# Patient Record
Sex: FEMALE | Race: WHITE | Employment: FULL TIME | URBAN - METROPOLITAN AREA
[De-identification: names, ages, dates, MRNs, and addresses within clinical notes are randomized per-mention and may not be internally consistent; named-entity substitution may affect disease eponyms.]

---

## 2017-05-01 ENCOUNTER — OFFICE VISIT (OUTPATIENT)
Dept: URGENT CARE | Age: 22
End: 2017-05-01

## 2017-05-01 VITALS
TEMPERATURE: 98.9 F | HEIGHT: 63 IN | OXYGEN SATURATION: 95 % | RESPIRATION RATE: 12 BRPM | HEART RATE: 67 BPM | DIASTOLIC BLOOD PRESSURE: 73 MMHG | BODY MASS INDEX: 26.22 KG/M2 | WEIGHT: 148 LBS | SYSTOLIC BLOOD PRESSURE: 115 MMHG

## 2017-05-01 DIAGNOSIS — L30.9 ACUTE DERMATITIS: Primary | ICD-10-CM

## 2017-05-01 PROCEDURE — 99203 OFFICE O/P NEW LOW 30 MIN: CPT | Performed by: EMERGENCY MEDICINE

## 2017-05-01 RX ORDER — TRIAMCINOLONE ACETONIDE 1 MG/G
CREAM TOPICAL
Qty: 15 G | Refills: 0 | Status: SHIPPED | OUTPATIENT
Start: 2017-05-01 | End: 2017-10-10 | Stop reason: SDUPTHER

## 2017-05-01 RX ORDER — TRIAMCINOLONE ACETONIDE 1 MG/G
CREAM TOPICAL
Qty: 15 G | Refills: 0 | Status: SHIPPED | OUTPATIENT
Start: 2017-05-01 | End: 2017-05-01

## 2017-05-01 ASSESSMENT — ENCOUNTER SYMPTOMS
SHORTNESS OF BREATH: 0
EYES NEGATIVE: 1
COUGH: 0
GASTROINTESTINAL NEGATIVE: 1
SORE THROAT: 0

## 2017-05-05 RX ORDER — KETOCONAZOLE 20 MG/G
CREAM TOPICAL
Qty: 15 G | Refills: 1 | Status: SHIPPED | OUTPATIENT
Start: 2017-05-05 | End: 2017-10-10

## 2017-10-10 ENCOUNTER — OFFICE VISIT (OUTPATIENT)
Dept: PRIMARY CARE CLINIC | Age: 22
End: 2017-10-10

## 2017-10-10 VITALS
BODY MASS INDEX: 26.79 KG/M2 | HEIGHT: 63 IN | SYSTOLIC BLOOD PRESSURE: 128 MMHG | DIASTOLIC BLOOD PRESSURE: 82 MMHG | WEIGHT: 151.2 LBS

## 2017-10-10 DIAGNOSIS — S86.899A SHIN SPLINTS, UNSPECIFIED LATERALITY, INITIAL ENCOUNTER: ICD-10-CM

## 2017-10-10 DIAGNOSIS — Z00.00 ROUTINE PHYSICAL EXAMINATION: Primary | ICD-10-CM

## 2017-10-10 PROCEDURE — 90686 IIV4 VACC NO PRSV 0.5 ML IM: CPT | Performed by: INTERNAL MEDICINE

## 2017-10-10 PROCEDURE — 90472 IMMUNIZATION ADMIN EACH ADD: CPT | Performed by: INTERNAL MEDICINE

## 2017-10-10 PROCEDURE — 90715 TDAP VACCINE 7 YRS/> IM: CPT | Performed by: INTERNAL MEDICINE

## 2017-10-10 PROCEDURE — 99395 PREV VISIT EST AGE 18-39: CPT | Performed by: INTERNAL MEDICINE

## 2017-10-10 PROCEDURE — 90471 IMMUNIZATION ADMIN: CPT | Performed by: INTERNAL MEDICINE

## 2017-10-10 RX ORDER — TRIAMCINOLONE ACETONIDE 1 MG/G
CREAM TOPICAL
Qty: 15 G | Refills: 0 | Status: SHIPPED | OUTPATIENT
Start: 2017-10-10 | End: 2018-05-31 | Stop reason: ALTCHOICE

## 2017-10-10 RX ORDER — ALBUTEROL SULFATE 90 UG/1
2 AEROSOL, METERED RESPIRATORY (INHALATION) EVERY 6 HOURS PRN
Qty: 1 INHALER | Refills: 3 | Status: SHIPPED | OUTPATIENT
Start: 2017-10-10

## 2017-10-10 ASSESSMENT — PATIENT HEALTH QUESTIONNAIRE - PHQ9
2. FEELING DOWN, DEPRESSED OR HOPELESS: 0
SUM OF ALL RESPONSES TO PHQ QUESTIONS 1-9: 0
1. LITTLE INTEREST OR PLEASURE IN DOING THINGS: 0
SUM OF ALL RESPONSES TO PHQ9 QUESTIONS 1 & 2: 0

## 2017-10-10 NOTE — PROGRESS NOTES
History and Physical      Ita Sweet  YOB: 1995    Date of Service:  10/10/2017    Chief Complaint:   Ita Sweet is a 23 y.o. female who presents for complete physical examination. HPI: doing well. Wants to start running and training 10k. Bilateral anterior shin pain after running    Wt Readings from Last 3 Encounters:   10/10/17 151 lb 3.2 oz (68.6 kg)   05/01/17 148 lb (67.1 kg)   10/26/16 151 lb (68.5 kg)     BP Readings from Last 3 Encounters:   10/10/17 128/82   05/01/17 115/73   10/26/16 122/78       Patient Active Problem List   Diagnosis    Seasonal allergic rhinitis    Exercise-induced asthma    Eczema       Preventive Care:  Health Maintenance   Topic Date Due    HIV screen  05/21/2008    DTaP/Tdap/Td vaccine (1 - Tdap) 05/21/2012    Pneumococcal med risk (1 of 1 - PPSV23) 05/21/2012    Chlamydia screen  08/28/2016    Flu vaccine (1) 09/01/2017    Cervical cancer screen  08/28/2018      Hx abnormal PAP: no  Good basic nutrition: yes  Self-breast exams: no    Exercise: aerobics 3 time(s) per week  Seatbelt use: 100%  Lipid panel:   Lab Results   Component Value Date    CHOL 195 01/21/2016    TRIG 123 01/21/2016    HDL 67 (H) 01/21/2016    LDLCALC 103 (H) 01/21/2016        Living will:  no,   patient declined      There is no immunization history on file for this patient.     Allergies   Allergen Reactions    Sulfa Antibiotics Hives     Outpatient Prescriptions Marked as Taking for the 10/10/17 encounter (Office Visit) with Michael Ferreira MD   Medication Sig Dispense Refill    fluticasone (FLONASE) 50 MCG/ACT nasal spray 1 spray by Nasal route daily 1 Bottle 3    albuterol sulfate HFA (PROVENTIL HFA) 108 (90 BASE) MCG/ACT inhaler Inhale 2 puffs into the lungs every 6 hours as needed for Wheezing 1 Inhaler 3       Past Medical History:   Diagnosis Date    Exercise-induced asthma 1/21/2016    Seasonal allergic rhinitis 1/21/2016     Past Surgical History: distress. She has no wheezes, rhonchi or rales. Abdominal: Soft, non-tender. Bowel sounds and aorta are normal. She exhibits no organomegaly, mass or bruit. Genitourinary: performed by gynecologist.  Breast exam:  performed by specialist.  Musculoskeletal: Normal range of motion, no synovitis. She exhibits no edema. Neurological: She is alert and oriented to person, place, and time. She has normal reflexes. No cranial nerve deficit. Coordination normal.   Skin: Skin is warm and dry. There is no rash or erythema. No suspicious lesions noted. Psychiatric: She has a normal mood and affect. Her speech is normal and behavior is normal. Judgment, cognition and memory are normal.         Lab Review: not applicable     Assessment/Plan:    1. Routine physical examination  Commended adequate calcium, vitamin D intake, regular exercise and good basic nutrition  - [facility] - Gisselle Dick MD  - Lipid Panel; Future  - Hemoglobin A1C; Future        3.  Shin splints, unspecified laterality, initial encounter  You tube stretches, ice, nsaids, rest prn  F/u sports med if worsen

## 2017-10-12 LAB
CHOLESTEROL, TOTAL: 171 MG/DL (ref 0–199)
HDLC SERPL-MCNC: 61 MG/DL (ref 40–60)
LDL CHOLESTEROL CALCULATED: 97 MG/DL
TRIGL SERPL-MCNC: 64 MG/DL (ref 0–150)
VLDLC SERPL CALC-MCNC: 13 MG/DL

## 2017-10-13 LAB
ESTIMATED AVERAGE GLUCOSE: 108.3 MG/DL
HBA1C MFR BLD: 5.4 %

## 2017-10-19 ENCOUNTER — OFFICE VISIT (OUTPATIENT)
Dept: GYNECOLOGY | Age: 22
End: 2017-10-19

## 2017-10-19 VITALS
OXYGEN SATURATION: 97 % | DIASTOLIC BLOOD PRESSURE: 68 MMHG | SYSTOLIC BLOOD PRESSURE: 113 MMHG | HEART RATE: 71 BPM | BODY MASS INDEX: 27.21 KG/M2 | WEIGHT: 152.4 LBS

## 2017-10-19 DIAGNOSIS — Z12.4 CERVICAL CANCER SCREENING: ICD-10-CM

## 2017-10-19 DIAGNOSIS — Z76.89 ENCOUNTER TO ESTABLISH CARE: Primary | ICD-10-CM

## 2017-10-19 DIAGNOSIS — Z01.419 WOMEN'S ANNUAL ROUTINE GYNECOLOGICAL EXAMINATION: ICD-10-CM

## 2017-10-19 DIAGNOSIS — Z11.3 SCREEN FOR STD (SEXUALLY TRANSMITTED DISEASE): ICD-10-CM

## 2017-10-19 PROCEDURE — 99385 PREV VISIT NEW AGE 18-39: CPT | Performed by: NURSE PRACTITIONER

## 2017-10-19 NOTE — PATIENT INSTRUCTIONS
condoms. For men  · Tests for sexually transmitted infections (STIs). Ask whether you should have tests for STIs. You may be at risk if you have sex with more than one person, especially if you do not wear a condom. · Testicular cancer exam. Ask your doctor whether you should check your testicles regularly. · Prostate exam. Talk to your doctor about whether you should have a blood test (called a PSA test) for prostate cancer. Experts differ on whether and when men should have this test. Some experts suggest it if you are older than 39 and are -American or have a father or brother who got prostate cancer when he was younger than 72. When should you call for help? Watch closely for changes in your health, and be sure to contact your doctor if you have any problems or symptoms that concern you. Where can you learn more? Go to https://Zen Plannerpebensoneb.healthAdzCentral. org and sign in to your Delta Systems account. Enter P072 in the BioConsortia box to learn more about \"Well Visit, Ages 25 to 48: Care Instructions. \"     If you do not have an account, please click on the \"Sign Up Now\" link. Current as of: July 19, 2016  Content Version: 11.3  © 3082-2760 Tabtor. Care instructions adapted under license by [facility] ([facility]). If you have questions about a medical condition or this instruction, always ask your healthcare professional. [place] [address] any warranty or liability for your use of this information. Patient Education        Pap Test: Care Instructions  Your Care Instructions  The Pap test (also called a Pap smear) is a screening test for cancer of the cervix, which is the lower part of the uterus that opens into the vagina. The test can help your doctor find early changes in the cells that could lead to cancer. The sample of cells taken during your test has been sent to a lab so that an expert can look at the cells.  It usually takes a week or two to get the results back. Follow-up care is a key part of your treatment and safety. Be sure to make and go to all appointments, and call your doctor if you are having problems. It's also a good idea to know your test results and keep a list of the medicines you take. What do the results mean? · A normal result means that the test did not find any abnormal cells in the sample. · An abnormal result can mean many things. Most of these are not cancer. The results of your test may be abnormal because:  ¨ You have an infection of the vagina or cervix, such as a yeast infection. ¨ You have an IUD (intrauterine device for birth control). ¨ You have low estrogen levels after menopause that are causing the cells to change. ¨ You have cell changes that may be a sign of precancer or cancer. The results are ranked based on how serious the changes might be. There are many other reasons why you might not get a normal result. If the results were abnormal, you may need to get another test within a few weeks or months. If the results show changes that could be a sign of cancer, you may need a test called a colposcopy, which provides a more complete view of the cervix. Sometimes the lab cannot use the sample because it does not contain enough cells or was not preserved well. If so, you may need to have the test again. This is not common, but it does happen from time to time. When should you call for help? Watch closely for changes in your health, and be sure to contact your doctor if:  · You have vaginal bleeding or pain for more than 2 days after the test. It is normal to have a small amount of bleeding for a day or two after the test.  Where can you learn more? Go to https://Altruikpemokono.Caribbean Telecom Partners. org and sign in to your Sportsgrit account. Enter Q454 in the UVLrx Therapeutics box to learn more about \"Pap Test: Care Instructions. \"     If you do not have an account, please click on the \"Sign Up Now\" link.   Current as of: July 26, 2016  Content Version: 11.3  © 2666-0126 Celleration. Care instructions adapted under license by [facility] ([facility]). If you have questions about a medical condition or this instruction, always ask your healthcare professional. [address] any warranty or liability for your use of this information. Patient Education        Breast Self-Exam: Care Instructions  Your Care Instructions  A breast self-exam is when you check your breasts for lumps or changes. This regular exam helps you learn how your breasts normally look and feel. Most breast problems or changes are not because of cancer. Breast self-exam is not a substitute for a mammogram. Having regular breast exams by your doctor and regular mammograms improve your chances of finding any problems with your breasts. Some women set a time each month to do a step-by-step breast self-exam. Other women like a less formal system. They might look at their breasts as they brush their teeth, or feel their breasts once in a while in the shower. If you notice a change in your breast, tell your doctor. Follow-up care is a key part of your treatment and safety. Be sure to make and go to all appointments, and call your doctor if you are having problems. Its also a good idea to know your test results and keep a list of the medicines you take. How do you do a breast self-exam?  · The best time to examine your breasts is usually one week after your menstrual period begins. Your breasts should not be tender then. If you do not have periods, you might do your exam on a day of the month that is easy to remember. · To examine your breasts:  ¨ Remove all your clothes above the waist and lie down. When you are lying down, your breast tissue spreads evenly over your chest wall, which makes it easier to feel all your breast tissue.   ¨ Use the padsnot the fingertipsof the 3 middle fingers of your left hand to check your right breast. Move your fingers slowly in small coin-sized circles that overlap. ¨ Use three levels of pressure to feel of all your breast tissue. Use light pressure to feel the tissue close to the skin surface. Use medium pressure to feel a little deeper. Use firm pressure to feel your tissue close to your breastbone and ribs. Use each pressure level to feel your breast tissue before moving on to the next spot. ¨ Check your entire breast, moving up and down as if following a strip from the collarbone to the bra line, and from the armpit to the ribs. Repeat until you have covered the entire breast.  ¨ Repeat this procedure for your left breast, using the pads of the 3 middle fingers of your right hand. · To examine your breasts while in the shower:  ¨ Place one arm over your head and lightly soap your breast on that side. ¨ Using the pads of your fingers, gently move your hand over your breast (in the strip pattern described above), feeling carefully for any lumps or changes. ¨ Repeat for the other breast.  · Have your doctor inspect anything you notice to see if you need further testing. Where can you learn more? Go to https://The Key RevolutionpeMacrotherapyeb.HOTPOTATO MEDIA. org and sign in to your Dimdim account. Enter P148 in the [facility] box to learn more about \"Breast Self-Exam: Care Instructions. \"     If you do not have an account, please click on the \"Sign Up Now\" link. Current as of: July 26, 2016  Content Version: 11.3  © 9989-0784 Comat Technologies. Care instructions adapted under license by [facility] ([facility]). If you have questions about a medical condition or this instruction, always ask your healthcare professional. [place] [address] any warranty or liability for your use of this information. Patient Education        Eating Healthy Foods: Care Instructions  Your Care Instructions  Eating healthy foods can help lower your risk for disease.  Healthy food gives you energy and keeps your heart often contain few or no fruits or vegetables. Make sure you eat some fruits and vegetables to make the meal more nutritious. · Look at your food diary. For each food group, add up what you have eaten and then divide the total by the number of days. This will give you an idea of how much you are eating from each food group. See if you can find some ways to change your diet to make it more healthy. Start small  · Do not try to make dramatic changes to your diet all at once. You might feel that you are missing out on your favorite foods and then be more likely to fail. · Start slowly, and gradually change your habits. Try some of the following:  ¨ Use whole wheat bread instead of white bread. ¨ Use nonfat or low-fat milk instead of whole milk. ¨ Eat brown rice instead of white rice, and eat whole wheat pasta instead of white-flour pasta. ¨ Try low-fat cheeses and low-fat yogurt. ¨ Add more fruits and vegetables to meals and have them for snacks. ¨ Add lettuce, tomato, cucumber, and onion to sandwiches. ¨ Add fruit to yogurt and cereal.  Enjoy food  · You can still eat your favorite foods. You just may need to eat less of them. If your favorite foods are high in fat, salt, and sugar, limit how often you eat them, but do not cut them out entirely. · Eat a wide variety of foods. Make healthy choices when eating out  · The type of restaurant you choose can help you make healthy choices. Even fast-food chains are now offering more low-fat or healthier choices on the menu. · Choose smaller portions, or take half of your meal home. · When eating out, try:  ¨ A veggie pizza with a whole wheat crust or grilled chicken (instead of sausage or pepperoni). ¨ Pasta with roasted vegetables, grilled chicken, or marinara sauce instead of cream sauce. ¨ A vegetable wrap or grilled chicken wrap. ¨ Broiled or poached food instead of fried or breaded items.   Make healthy choices easy  · Buy packaged, prewashed, ready-to-eat fresh vegetables and fruits, such as baby carrots, salad mixes, and chopped or shredded broccoli and cauliflower. · Buy packaged, presliced fruits, such as melon or pineapple. · Choose 100% fruit or vegetable juice instead of soda. Limit juice intake to 4 to 6 oz (½ to ¾ cup) a day. · Blend low-fat yogurt, fruit juice, and canned or frozen fruit to make a smoothie for breakfast or a snack. Where can you learn more? Go to https://ChipSensorspeArray StormewXAircraft.FriendsEAT. org and sign in to your Sociable Labs account. Enter G521 in the Communication Science box to learn more about \"Eating Healthy Foods: Care Instructions. \"     If you do not have an account, please click on the \"Sign Up Now\" link. Current as of: April 3, 2017  Content Version: 11.3  © 4600-7500 Motilo. Care instructions adapted under license by [facility] ([facility]). If you have questions about a medical condition or this instruction, always ask your healthcare professional. [place] [address] any warranty or liability for your use of this information. Patient Education        Learning About Physical Activity  What is physical activity? Physical activity is any kind of activity that gets your body moving. The types of physical activity that can help you get fit and stay healthy include:  · Aerobic or \"cardio\" activities that make your heart beat faster and make you breathe harder, such as brisk walking, riding a bike, or running. Aerobic activities strengthen your heart and lungs and build up your endurance. · Strength training activities that make your muscles work against, or \"resist,\" something, such as lifting weights or doing push-ups. These activities help tone and strengthen your muscles. · Stretches that allow you to move your joints and muscles through their full range of motion. Stretching helps you be more flexible and avoid injury. What are the benefits of physical activity?   Being active is one of the best things weights or dumbbells or use stretch bands at home or in a gym or community center. Stretch your muscles often. Stretching will help you as you become more active. It can help you stay flexible, loosen tight muscles, and avoid injury. It can also help improve your balance and posture and can be a great way to relax. Be sure to stretch the muscles you'll be using when you work out. It's best to warm your muscles slightly before you stretch them. Walk or do some other light aerobic activity for a few minutes, and then start stretching. When you stretch your muscles:  · Do it slowly. Stretching is not about going fast or making sudden movements. · Don't push or bounce during a stretch. · Hold each stretch for at least 15 to 30 seconds, if you can. You should feel a stretch in the muscle, but not pain. · Breathe out as you do the stretch. Then breathe in as you hold the stretch. Don't hold your breath. If you're worried about how more activity might affect your health, have a checkup before you start. Follow any special advice your doctor gives you for getting a smart start. Where can you learn more? Go to https://Crowd Supplypepiceweb.SquareHub. org and sign in to your Marketshot account. Enter Y132 in the Orbital Traction box to learn more about \"Learning About Physical Activity. \"     If you do not have an account, please click on the \"Sign Up Now\" link. Current as of: March 13, 2017  Content Version: 11.3  © 2811-9194 BIW Technologies, eCert. Care instructions adapted under license by [facility] ([facility]). If you have questions about a medical condition or this instruction, always ask your healthcare professional. [place] [address] any warranty or liability for your use of this information.

## 2017-10-19 NOTE — PROGRESS NOTES
[address] Gynecology  Annual GYN Visit  Chaitanya Soto CNP    Ale Wray  YOB: 1995    Date of Service:  10/19/2017    Vitals:    10/19/17 0800 10/19/17 0802   BP: 113/68    Pulse:  71   TempSrc:  Oral   SpO2:  97%   Weight:  152 lb 6.4 oz (69.1 kg)     Body mass index is 27.21 kg/m². Wt Readings from Last 3 Encounters:   10/19/17 152 lb 6.4 oz (69.1 kg)   10/10/17 151 lb 3.2 oz (68.6 kg)   05/01/17 148 lb (67.1 kg)     BP Readings from Last 3 Encounters:   10/19/17 113/68   10/10/17 128/82   05/01/17 115/73       Chief Complaint:   Ale Wray is a 23 y.o. female who presents as a new patient for routine annual gynecologic visit. HPI:  Patient denies irregular menses, heavy menses, dysmenorrhea, dyspareunia, pelvic pain, vaginal discharge, vulvar/vaginal itching, vulvar/vaginal irritation/pain, urinary frequency, urinary urgency and urinary incontinence. OCP/HRT side effects: not applicable. Patient is not currently sexually active; no concerns for STDs. Patient's last menstrual period was 09/18/2017. Allergies   Allergen Reactions    Sulfa Antibiotics Hives     Outpatient Prescriptions Marked as Taking for the 10/19/17 encounter (Office Visit) with Sheryl Siegel CNP   Medication Sig Dispense Refill    albuterol sulfate HFA (PROVENTIL HFA) 108 (90 Base) MCG/ACT inhaler Inhale 2 puffs into the lungs every 6 hours as needed for Wheezing 1 Inhaler 3    triamcinolone (KENALOG) 0.1 % cream Apply sparingly to affected area BID-TID 15 g 0    fluticasone (FLONASE) 50 MCG/ACT nasal spray 1 spray by Nasal route daily 1 Bottle 3       Review of Systems:  A comprehensive review of systems was negative except for what was noted in the HPI. Physical Exam:  Constitutional: She is oriented to person, place, and time. She appears well-developed and well-nourished. No distress. Neck: No mass and no thyromegaly present.    Cardiovascular: Normal rate, regular rhythm and normal

## 2017-10-26 LAB
C. TRACHOMATIS DNA,THIN PREP: NEGATIVE
N. GONORRHOEAE DNA, THIN PREP: NEGATIVE

## 2018-02-02 ENCOUNTER — OFFICE VISIT (OUTPATIENT)
Dept: DERMATOLOGY | Age: 23
End: 2018-02-02

## 2018-02-02 DIAGNOSIS — D22.9 MULTIPLE BENIGN MELANOCYTIC NEVI: Primary | ICD-10-CM

## 2018-02-02 DIAGNOSIS — L81.2 EPHELIDES: ICD-10-CM

## 2018-02-02 DIAGNOSIS — Z78.9 NON-TOBACCO USER: ICD-10-CM

## 2018-02-02 DIAGNOSIS — D18.01 CHERRY ANGIOMA: ICD-10-CM

## 2018-02-02 PROCEDURE — 99203 OFFICE O/P NEW LOW 30 MIN: CPT | Performed by: DERMATOLOGY

## 2018-02-02 NOTE — PROGRESS NOTES
mm round uniformly brown macules located to nose, cheeks, and upper back    Assessment and Plan     1. Multiple benign melanocytic nevi    2. Cherry angioma    3. Ephelides    4. Non-tobacco user        1. Multiple benign melanocytic nevi  Benign acquired melanocytic nevi  -Recommend monthly self skin exams   -Educated regarding the ABCDEs of melanoma detection   -Call for any new/changing moles or concerning lesions  -Reviewed sun protective behavior -- sun avoidance during the peak hours of the day, sun-protective clothing (including hat and sunglasses), sunscreen use (water resistant, broad spectrum, SPF at least 30, need for reapplication every 2 to 3 hours), avoidance of tanning beds   -Plan: Observation with annual skin checks (earlier if indicated) performed in office to monitor current nevi and to assess for new lesions. 2. Cherry angioma  -Educated patient on benign nature and the potential to develop more cherry angiomas as one ages. -Due to benign nature, no treatment is necessary. Reassurance and observation recommended. 3. Ephelides  -Edu re: benignity, relationship w/  sun exposure, darkening w/ unprotected sun exposure  -Reviewed sun protective behavior -- sun avoidance during the peak hours of the day, sun-protective clothing (including hat and sunglasses), sunscreen use (water resistant, broad spectrum, SPF at least 30, need for reapplication every 2 to 3 hours), avoidance of tanning beds     4. Non-tobacco user  -continue with tobacco cessation        Note is transcribed using voice recognition software. Inadvertent computerized transcription errors may be present. Return in about 1 year (around 2/2/2019) for TBSE.

## 2018-03-01 ENCOUNTER — OFFICE VISIT (OUTPATIENT)
Dept: URGENT CARE | Age: 23
End: 2018-03-01

## 2018-03-01 VITALS
OXYGEN SATURATION: 98 % | HEART RATE: 67 BPM | WEIGHT: 162 LBS | HEIGHT: 62 IN | BODY MASS INDEX: 29.81 KG/M2 | RESPIRATION RATE: 16 BRPM | SYSTOLIC BLOOD PRESSURE: 116 MMHG | DIASTOLIC BLOOD PRESSURE: 80 MMHG | TEMPERATURE: 98.7 F

## 2018-03-01 DIAGNOSIS — J01.90 ACUTE SINUSITIS, RECURRENCE NOT SPECIFIED, UNSPECIFIED LOCATION: Primary | ICD-10-CM

## 2018-03-01 PROCEDURE — 99214 OFFICE O/P EST MOD 30 MIN: CPT | Performed by: EMERGENCY MEDICINE

## 2018-03-01 RX ORDER — AMOXICILLIN 500 MG/1
500 CAPSULE ORAL 3 TIMES DAILY
Qty: 30 CAPSULE | Refills: 0 | Status: SHIPPED | OUTPATIENT
Start: 2018-03-01 | End: 2018-03-11

## 2018-03-01 ASSESSMENT — ENCOUNTER SYMPTOMS
SORE THROAT: 1
SINUS PAIN: 1
SINUS PRESSURE: 1
COUGH: 1

## 2018-03-06 ENCOUNTER — OFFICE VISIT (OUTPATIENT)
Dept: GYNECOLOGY | Age: 23
End: 2018-03-06

## 2018-03-06 VITALS
DIASTOLIC BLOOD PRESSURE: 80 MMHG | HEART RATE: 72 BPM | SYSTOLIC BLOOD PRESSURE: 114 MMHG | WEIGHT: 162 LBS | BODY MASS INDEX: 29.63 KG/M2

## 2018-03-06 DIAGNOSIS — Z11.3 SCREENING FOR STDS (SEXUALLY TRANSMITTED DISEASES): ICD-10-CM

## 2018-03-06 DIAGNOSIS — N89.8 VAGINAL DISCHARGE: Primary | ICD-10-CM

## 2018-03-06 DIAGNOSIS — R30.0 DYSURIA: ICD-10-CM

## 2018-03-06 LAB
BACTERIA WET PREP: ABNORMAL
BILIRUBIN, POC: NORMAL
BLOOD URINE, POC: NORMAL
CLARITY, POC: NORMAL
CLUE CELLS: ABNORMAL
COLOR, POC: YELLOW
EPITHELIAL CELLS WET PREP: ABNORMAL
GLUCOSE URINE, POC: NORMAL
KETONES, POC: NORMAL
LEUKOCYTE EST, POC: NORMAL
NITRITE, POC: NORMAL
PH, POC: 6.5
PROTEIN, POC: NORMAL
RBC WET PREP: ABNORMAL
SOURCE WET PREP: ABNORMAL
SPECIFIC GRAVITY, POC: 1010
TRICHOMONAS PREP: ABNORMAL
UROBILINOGEN, POC: 0.2
WBC WET PREP: ABNORMAL
YEAST WET PREP: ABNORMAL

## 2018-03-06 PROCEDURE — 99213 OFFICE O/P EST LOW 20 MIN: CPT | Performed by: NURSE PRACTITIONER

## 2018-03-06 PROCEDURE — 81002 URINALYSIS NONAUTO W/O SCOPE: CPT | Performed by: NURSE PRACTITIONER

## 2018-03-06 RX ORDER — FLUCONAZOLE 150 MG/1
150 TABLET ORAL
Qty: 4 TABLET | Refills: 0 | Status: SHIPPED | OUTPATIENT
Start: 2018-03-06 | End: 2018-03-16

## 2018-03-06 RX ORDER — FLUCONAZOLE 150 MG/1
150 TABLET ORAL
Qty: 4 TABLET | Refills: 0 | Status: SHIPPED | OUTPATIENT
Start: 2018-03-06 | End: 2018-03-06 | Stop reason: SDUPTHER

## 2018-03-06 NOTE — PATIENT INSTRUCTIONS
Patient Education        Vaginal Yeast Infection: Care Instructions  Your Care Instructions    A vaginal yeast infection is caused by too many yeast cells in the vagina. This is common in women of all ages. Itching, vaginal discharge and irritation, and other symptoms can bother you. But yeast infections don't often cause other health problems. Some medicines can increase your risk of getting a yeast infection. These include antibiotics, birth control pills, hormones, and steroids. You may also be more likely to get a yeast infection if you are pregnant, have diabetes, douche, or wear tight clothes. With treatment, most yeast infections get better in 2 to 3 days. Follow-up care is a key part of your treatment and safety. Be sure to make and go to all appointments, and call your doctor if you are having problems. It's also a good idea to know your test results and keep a list of the medicines you take. How can you care for yourself at home? · Take your medicines exactly as prescribed. Call your doctor if you think you are having a problem with your medicine. · Ask your doctor about over-the-counter (OTC) medicines for yeast infections. They may cost less than prescription medicines. If you use an OTC treatment, read and follow all instructions on the label. · Do not use tampons while using a vaginal cream or suppository. The tampons can absorb the medicine. Use pads instead. · Wear loose cotton clothing. Do not wear nylon or other fabric that holds body heat and moisture close to the skin. · Try sleeping without underwear. · Do not scratch. Relieve itching with a cold pack or a cool bath. · Do not wash your vaginal area more than once a day. Use plain water or a mild, unscented soap. Air-dry the vaginal area. · Change out of wet swimsuits after swimming. · Do not have sex until you have finished your treatment. · Do not douche. When should you call for help?   Call your doctor now or seek immediate medical care if:  ? · You have unexpected vaginal bleeding. ? · You have new or increased pain in your vagina or pelvis. ? Watch closely for changes in your health, and be sure to contact your doctor if:  ? · You have a fever. ? · You are not getting better after 2 days. ? · Your symptoms come back after you finish your medicines. Where can you learn more? Go to https://[email or web address].[email or web address] org and sign in to your Bluetector account. Enter X125 in the Ifinity box to learn more about \"Vaginal Yeast Infection: Care Instructions. \"     If you do not have an account, please click on the \"Sign Up Now\" link. Current as of: October 13, 2016  Content Version: 11.5  © 3167-0195 [employer]. Care instructions adapted under license by [facility] ([facility]). If you have questions about a medical condition or this instruction, always ask your healthcare professional. [place] [address] any warranty or liability for your use of this information. Patient Education        Bacterial Vaginosis: Care Instructions  Your Care Instructions    Bacterial vaginosis is a type of vaginal infection. It is caused by excess growth of certain bacteria that are normally found in the vagina. Symptoms can include itching, swelling, pain when you urinate or have sex, and a gray or yellow discharge with a \"fishy\" odor. It is not considered an infection that is spread through sexual contact. Although symptoms can be annoying and uncomfortable, bacterial vaginosis does not usually cause other health problems. However, if you have it while you are pregnant, it can cause complications. While the infection may go away on its own, most doctors use antibiotics to treat it. You may have been prescribed pills or vaginal cream. With treatment, bacterial vaginosis usually clears up in 5 to 7 days. Follow-up care is a key part of your treatment and safety.  Be sure to make and go to all appointments, and Women: Care Instructions  Your Care Instructions    A urinary tract infection, or UTI, is a general term for an infection anywhere between the kidneys and the urethra (where urine comes out). Most UTIs are bladder infections. They often cause pain or burning when you urinate. UTIs are caused by bacteria and can be cured with antibiotics. Be sure to complete your treatment so that the infection goes away. Follow-up care is a key part of your treatment and safety. Be sure to make and go to all appointments, and call your doctor if you are having problems. It's also a good idea to know your test results and keep a list of the medicines you take. How can you care for yourself at home? · Take your antibiotics as directed. Do not stop taking them just because you feel better. You need to take the full course of antibiotics. · Drink extra water and other fluids for the next day or two. This may help wash out the bacteria that are causing the infection. (If you have kidney, heart, or liver disease and have to limit fluids, talk with your doctor before you increase your fluid intake.)  · Avoid drinks that are carbonated or have caffeine. They can irritate the bladder. · Urinate often. Try to empty your bladder each time. · To relieve pain, take a hot bath or lay a heating pad set on low over your lower belly or genital area. Never go to sleep with a heating pad in place. To prevent UTIs  · Drink plenty of water each day. This helps you urinate often, which clears bacteria from your system. (If you have kidney, heart, or liver disease and have to limit fluids, talk with your doctor before you increase your fluid intake.)  · Urinate when you need to. · Urinate right after you have sex. · Change sanitary pads often. · Avoid douches, bubble baths, feminine hygiene sprays, and other feminine hygiene products that have deodorants. · After going to the bathroom, wipe from front to back.   When should you call for

## 2018-03-07 LAB — URINE CULTURE, ROUTINE: NORMAL

## 2018-03-08 LAB
C TRACH DNA GENITAL QL NAA+PROBE: NEGATIVE
GENITAL CULTURE, ROUTINE: ABNORMAL
GENITAL CULTURE, ROUTINE: ABNORMAL
N. GONORRHOEAE DNA: NEGATIVE
ORGANISM: ABNORMAL

## 2018-03-09 DIAGNOSIS — B37.2 YEAST DERMATITIS: ICD-10-CM

## 2018-03-09 RX ORDER — NYSTATIN 100000 U/G
CREAM TOPICAL
Qty: 1 TUBE | Refills: 0 | Status: SHIPPED | OUTPATIENT
Start: 2018-03-09 | End: 2018-05-31 | Stop reason: ALTCHOICE

## 2018-03-30 ENCOUNTER — OFFICE VISIT (OUTPATIENT)
Dept: PRIMARY CARE CLINIC | Age: 23
End: 2018-03-30

## 2018-03-30 VITALS
SYSTOLIC BLOOD PRESSURE: 130 MMHG | DIASTOLIC BLOOD PRESSURE: 78 MMHG | HEIGHT: 63 IN | WEIGHT: 165 LBS | BODY MASS INDEX: 29.23 KG/M2

## 2018-03-30 PROCEDURE — 99214 OFFICE O/P EST MOD 30 MIN: CPT | Performed by: INTERNAL MEDICINE

## 2018-03-30 RX ORDER — SUMATRIPTAN 100 MG/1
TABLET, FILM COATED ORAL
Qty: 27 TABLET | Refills: 3 | Status: SHIPPED | OUTPATIENT
Start: 2018-03-30

## 2018-03-30 NOTE — PROGRESS NOTES
Shelly Diego is a 23 y.o. female presenting today with c/o    Migraines  Started in high school- used to have tunnel vision with them. None this time  3 migraines over past 2 months  This week had severe migraine. Same quality as prior migraines  Behind left eye  No aura  Lasted 3 days  Triggered by stress  Throbbing, constant  9/10 pain  Tried excedrin, ibuprofen 400 mg-did not alleviate  Photophobia  No nausea  No phonophobia      Review of Systems - comprehensive review of systems negative except as noted in HPI    Allergies   Allergen Reactions    Sulfa Antibiotics Hives       Past Medical History:   Diagnosis Date    Exercise-induced asthma 1/21/2016    Seasonal allergic rhinitis 1/21/2016       Past Surgical History:   Procedure Laterality Date    CHALAZION EXCISION      LASIK      WISDOM TOOTH EXTRACTION         Family History   Problem Relation Age of Onset    Coronary Art Dis Maternal Grandfather     Hypertension Maternal Grandmother        Social History     Social History    Marital status: Single     Spouse name: N/A    Number of children: N/A    Years of education: N/A     Occupational History    Jew      Social History Main Topics    Smoking status: Never Smoker    Smokeless tobacco: Never Used    Alcohol use 3.0 oz/week     5 Glasses of wine per week      Comment: social    Drug use: No    Sexual activity: Not Currently     Partners: Male     Other Topics Concern    Not on file     Social History Narrative    [employer]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  1 Tube 0    Drospiren-Eth Estrad-Levomefol 3-0.02-0.451 MG TABS Take 1 tablet by mouth daily 28 tablet 11    albuterol sulfate HFA (PROVENTIL HFA) 108 (90 Base) MCG/ACT

## 2018-05-08 ENCOUNTER — EMPLOYEE WELLNESS (OUTPATIENT)
Dept: OTHER | Age: 23
End: 2018-05-08

## 2018-05-08 LAB
CHOLESTEROL, TOTAL: 177 MG/DL (ref 0–199)
GLUCOSE BLD-MCNC: 77 MG/DL (ref 70–99)
HDLC SERPL-MCNC: 65 MG/DL (ref 40–60)
LDL CHOLESTEROL CALCULATED: 92 MG/DL
TRIGL SERPL-MCNC: 99 MG/DL (ref 0–150)

## 2018-05-14 VITALS — BODY MASS INDEX: 28.52 KG/M2 | WEIGHT: 161 LBS

## 2018-05-31 ENCOUNTER — OFFICE VISIT (OUTPATIENT)
Dept: URGENT CARE | Age: 23
End: 2018-05-31

## 2018-05-31 VITALS
HEIGHT: 62 IN | OXYGEN SATURATION: 98 % | SYSTOLIC BLOOD PRESSURE: 128 MMHG | DIASTOLIC BLOOD PRESSURE: 90 MMHG | BODY MASS INDEX: 29.08 KG/M2 | WEIGHT: 158 LBS | HEART RATE: 86 BPM

## 2018-05-31 DIAGNOSIS — S16.1XXA STRAIN OF NECK MUSCLE, INITIAL ENCOUNTER: ICD-10-CM

## 2018-05-31 DIAGNOSIS — S09.90XA CLOSED HEAD INJURY, INITIAL ENCOUNTER: Primary | ICD-10-CM

## 2018-05-31 PROCEDURE — 99214 OFFICE O/P EST MOD 30 MIN: CPT | Performed by: EMERGENCY MEDICINE

## 2018-05-31 ASSESSMENT — ENCOUNTER SYMPTOMS
NAUSEA: 1
COUGH: 0
SHORTNESS OF BREATH: 0
EYES NEGATIVE: 1
VOMITING: 0
BACK PAIN: 0
ABDOMINAL PAIN: 0

## 2018-06-04 ENCOUNTER — PATIENT MESSAGE (OUTPATIENT)
Dept: GYNECOLOGY | Age: 23
End: 2018-06-04

## 2018-06-06 ENCOUNTER — NURSE ONLY (OUTPATIENT)
Dept: GYNECOLOGY | Age: 23
End: 2018-06-06

## 2018-06-06 DIAGNOSIS — A54.9 GONORRHEA: Primary | ICD-10-CM

## 2018-06-06 PROCEDURE — 96372 THER/PROPH/DIAG INJ SC/IM: CPT | Performed by: NURSE PRACTITIONER

## 2018-06-06 RX ORDER — CEFTRIAXONE SODIUM 250 MG/1
250 INJECTION, POWDER, FOR SOLUTION INTRAMUSCULAR; INTRAVENOUS ONCE
Status: COMPLETED | OUTPATIENT
Start: 2018-06-06 | End: 2018-06-06

## 2018-06-06 RX ORDER — AZITHROMYCIN 500 MG/1
1000 TABLET, FILM COATED ORAL ONCE
Qty: 2 TABLET | Refills: 0 | Status: SHIPPED | OUTPATIENT
Start: 2018-06-06 | End: 2018-06-06

## 2018-06-06 RX ADMIN — CEFTRIAXONE SODIUM 250 MG: 250 INJECTION, POWDER, FOR SOLUTION INTRAMUSCULAR; INTRAVENOUS at 14:35

## 2018-09-04 ENCOUNTER — OFFICE VISIT (OUTPATIENT)
Dept: URGENT CARE | Age: 23
End: 2018-09-04

## 2018-09-04 VITALS
HEIGHT: 63 IN | HEART RATE: 75 BPM | RESPIRATION RATE: 12 BRPM | SYSTOLIC BLOOD PRESSURE: 129 MMHG | WEIGHT: 160 LBS | BODY MASS INDEX: 28.35 KG/M2 | DIASTOLIC BLOOD PRESSURE: 82 MMHG | TEMPERATURE: 98.1 F

## 2018-09-04 DIAGNOSIS — L30.9 DERMATITIS DUE TO UNKNOWN CAUSE: Primary | ICD-10-CM

## 2018-09-04 PROCEDURE — 99212 OFFICE O/P EST SF 10 MIN: CPT | Performed by: PHYSICIAN ASSISTANT

## 2018-09-04 ASSESSMENT — ENCOUNTER SYMPTOMS
VOMITING: 0
NAUSEA: 0

## 2018-09-04 NOTE — PROGRESS NOTES
Reason for Visit:   Chief Complaint   Patient presents with    Rash     Has a red rash on right side of back, thinks it might be from an insect, noticed it sunday night and it got worse now. Patient History     HPI: Simon Johnson is a 20 y.o. female who presents with sudden onset of erythematous and pruritic rash at R lateral torso present for 2-3 days. She believes she felt a \"prick\" sensation when outside on Sunday but not sure what this may have been (possible insect bite/sting). The reports subsequent pruritis and tenderness at site. She denies any bleeding, weeping, or spread of rash anywhere else. She reports allergic reactions to topical creams and has not been putting anything topical on the site. She hasn't taken any oral or OTC meds for this but she does take zyrtec daily for her allergies. Past Medical History:   Diagnosis Date    Exercise-induced asthma 1/21/2016    Headache     Seasonal allergic rhinitis 1/21/2016       Past Surgical History:   Procedure Laterality Date    CHALAZION EXCISION      LASIK      WISDOM TOOTH EXTRACTION         Allergies: Allergies   Allergen Reactions    Sulfa Antibiotics Hives       Review of Systems     ROS: Please refer to HPI for any pertinent positive findings, as all other systems were reviewed as negative unless otherwise listed above. Review of Systems   Constitutional: Negative for chills, diaphoresis, fever and malaise/fatigue. Gastrointestinal: Negative for nausea and vomiting. Skin: Positive for itching and rash. Endo/Heme/Allergies: Positive for environmental allergies. Physical Exam     Vitals:    09/04/18 0920   BP: 129/82   Pulse: 75   Resp: 12   Temp: 98.1 °F (36.7 °C)       Constitutional:  Well developed, well nourished, no acute distress, non-toxic appearance   Eyes:  PERRL, conjunctiva normal, no ciliary injection, evidence of foreign body, or discharge.   HENT:

## 2018-10-08 ENCOUNTER — OFFICE VISIT (OUTPATIENT)
Dept: PRIMARY CARE CLINIC | Age: 23
End: 2018-10-08
Payer: COMMERCIAL

## 2018-10-08 VITALS
HEIGHT: 63 IN | WEIGHT: 157.4 LBS | BODY MASS INDEX: 27.89 KG/M2 | SYSTOLIC BLOOD PRESSURE: 116 MMHG | DIASTOLIC BLOOD PRESSURE: 70 MMHG

## 2018-10-08 DIAGNOSIS — F41.9 ANXIETY: ICD-10-CM

## 2018-10-08 DIAGNOSIS — Z00.00 ROUTINE PHYSICAL EXAMINATION: Primary | ICD-10-CM

## 2018-10-08 DIAGNOSIS — Z23 NEED FOR INFLUENZA VACCINATION: ICD-10-CM

## 2018-10-08 PROCEDURE — 90471 IMMUNIZATION ADMIN: CPT | Performed by: INTERNAL MEDICINE

## 2018-10-08 PROCEDURE — 90686 IIV4 VACC NO PRSV 0.5 ML IM: CPT | Performed by: INTERNAL MEDICINE

## 2018-10-08 PROCEDURE — 99395 PREV VISIT EST AGE 18-39: CPT | Performed by: INTERNAL MEDICINE

## 2018-10-08 ASSESSMENT — PATIENT HEALTH QUESTIONNAIRE - PHQ9
SUM OF ALL RESPONSES TO PHQ QUESTIONS 1-9: 0
1. LITTLE INTEREST OR PLEASURE IN DOING THINGS: 0
SUM OF ALL RESPONSES TO PHQ QUESTIONS 1-9: 0
SUM OF ALL RESPONSES TO PHQ9 QUESTIONS 1 & 2: 0
2. FEELING DOWN, DEPRESSED OR HOPELESS: 0

## 2018-10-08 NOTE — PROGRESS NOTES
Vaccine Information Sheet, \"Influenza - Inactivated\"  given to Fiorella Diaz, or parent/legal guardian of  Fiorella Diaz and verbalized understanding. Patient responses:    Have you ever had a reaction to a flu vaccine? No  Are you able to eat eggs without adverse effects? No  Do you have any current illness? No  Have you ever had Guillian Lamar Syndrome? No    Flu vaccine given per order. Please see immunization tab.

## 2018-10-10 ENCOUNTER — HOSPITAL ENCOUNTER (EMERGENCY)
Age: 23
Discharge: HOME | End: 2018-10-10
Attending: EMERGENCY MEDICINE
Payer: COMMERCIAL

## 2018-10-10 ENCOUNTER — NURSE TRIAGE (OUTPATIENT)
Dept: OTHER | Facility: CLINIC | Age: 23
End: 2018-10-10

## 2018-10-10 ENCOUNTER — OFFICE VISIT (OUTPATIENT)
Dept: PRIMARY CARE CLINIC | Age: 23
End: 2018-10-10
Payer: COMMERCIAL

## 2018-10-10 VITALS
HEIGHT: 63 IN | DIASTOLIC BLOOD PRESSURE: 87 MMHG | TEMPERATURE: 98 F | RESPIRATION RATE: 18 BRPM | WEIGHT: 157 LBS | OXYGEN SATURATION: 98 % | SYSTOLIC BLOOD PRESSURE: 124 MMHG | BODY MASS INDEX: 27.82 KG/M2 | HEART RATE: 85 BPM

## 2018-10-10 VITALS
HEART RATE: 67 BPM | BODY MASS INDEX: 28.17 KG/M2 | HEIGHT: 63 IN | DIASTOLIC BLOOD PRESSURE: 84 MMHG | WEIGHT: 159 LBS | SYSTOLIC BLOOD PRESSURE: 110 MMHG | OXYGEN SATURATION: 99 %

## 2018-10-10 DIAGNOSIS — Z20.9 EXPOSURE TO BAT WITHOUT KNOWN BITE: Primary | ICD-10-CM

## 2018-10-10 DIAGNOSIS — F41.9 ANXIETY: Primary | ICD-10-CM

## 2018-10-10 DIAGNOSIS — Z23 NEED FOR PROPHYLACTIC VACCINATION AND INOCULATION AGAINST RABIES: ICD-10-CM

## 2018-10-10 PROCEDURE — 6360000002 HC RX W HCPCS: Performed by: EMERGENCY MEDICINE

## 2018-10-10 PROCEDURE — 90471 IMMUNIZATION ADMIN: CPT | Performed by: EMERGENCY MEDICINE

## 2018-10-10 PROCEDURE — 90675 RABIES VACCINE IM: CPT | Performed by: EMERGENCY MEDICINE

## 2018-10-10 PROCEDURE — 90375 RABIES IG IM/SC: CPT | Performed by: EMERGENCY MEDICINE

## 2018-10-10 PROCEDURE — 99214 OFFICE O/P EST MOD 30 MIN: CPT | Performed by: EMERGENCY MEDICINE

## 2018-10-10 PROCEDURE — 99283 EMERGENCY DEPT VISIT LOW MDM: CPT

## 2018-10-10 RX ADMIN — RABIES VACCINE 1 ML: KIT at 16:26

## 2018-10-10 RX ADMIN — RABIES IMMUNE GLOBULIN (HUMAN) 1425 UNITS: 150 INJECTION INTRAMUSCULAR at 16:27
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
## 2018-10-15 ENCOUNTER — HOSPITAL ENCOUNTER (OUTPATIENT)
Dept: ONCOLOGY | Age: 23
Setting detail: INFUSION SERIES
Discharge: HOME | End: 2018-10-15
Payer: COMMERCIAL

## 2018-10-15 VITALS
DIASTOLIC BLOOD PRESSURE: 83 MMHG | SYSTOLIC BLOOD PRESSURE: 117 MMHG | RESPIRATION RATE: 16 BRPM | TEMPERATURE: 97.9 F | HEART RATE: 78 BPM

## 2018-10-15 PROCEDURE — 6360000002 HC RX W HCPCS: Performed by: INTERNAL MEDICINE

## 2018-10-15 PROCEDURE — 90471 IMMUNIZATION ADMIN: CPT | Performed by: INTERNAL MEDICINE

## 2018-10-15 PROCEDURE — 96372 THER/PROPH/DIAG INJ SC/IM: CPT

## 2018-10-15 PROCEDURE — 90675 RABIES VACCINE IM: CPT | Performed by: INTERNAL MEDICINE

## 2018-10-15 PROCEDURE — G0463 HOSPITAL OUTPT CLINIC VISIT: HCPCS

## 2018-10-15 PROCEDURE — 99201 HC NEW PT, OUTPT VISIT LEVEL 1: CPT

## 2018-10-15 RX ADMIN — RABIES VACCINE 1 ML: KIT at 15:13

## 2018-10-15 NOTE — PROGRESS NOTES
Pt seen and assessed [facility] today for Rabies injection per orders from Dr. Geovanna James. Pt tolerated infusion well and without incident. Pt verbalizes understanding of discharge instructions. Discharged ambulatory to home.

## 2018-10-15 NOTE — PLAN OF CARE
Problem: KNOWLEDGE DEFICIT  Goal: Patient/S.O. demonstrates understanding of disease process, treatment plan, medications, and discharge instructions.   Outcome: Ongoing      Problem: DISCHARGE BARRIERS  Goal: Patient's continuum of care needs are met  Outcome: Met This Shift

## 2018-10-18 ENCOUNTER — HOSPITAL ENCOUNTER (OUTPATIENT)
Dept: ONCOLOGY | Age: 23
Setting detail: INFUSION SERIES
Discharge: HOME | End: 2018-10-18
Payer: COMMERCIAL

## 2018-10-18 VITALS
HEART RATE: 76 BPM | TEMPERATURE: 98.2 F | DIASTOLIC BLOOD PRESSURE: 88 MMHG | RESPIRATION RATE: 16 BRPM | SYSTOLIC BLOOD PRESSURE: 120 MMHG

## 2018-10-18 PROCEDURE — 90471 IMMUNIZATION ADMIN: CPT | Performed by: INTERNAL MEDICINE

## 2018-10-18 PROCEDURE — 6360000002 HC RX W HCPCS: Performed by: INTERNAL MEDICINE

## 2018-10-18 PROCEDURE — 90675 RABIES VACCINE IM: CPT | Performed by: INTERNAL MEDICINE

## 2018-10-18 PROCEDURE — 96372 THER/PROPH/DIAG INJ SC/IM: CPT

## 2018-10-18 RX ADMIN — RABIES VACCINE 1 ML: KIT at 16:14

## 2018-10-19 NOTE — PLAN OF CARE
Problem: KNOWLEDGE DEFICIT  Goal: Patient/S.O. demonstrates understanding of disease process, treatment plan, medications, and discharge instructions. Outcome: Ongoing  Patient seen in [facility] for Rabies Injection. Tolerated injection well. Verbalized understanding of d/c instructions. D/C'd ambulatory.

## 2018-10-25 ENCOUNTER — HOSPITAL ENCOUNTER (OUTPATIENT)
Dept: ONCOLOGY | Age: 23
Setting detail: INFUSION SERIES
Discharge: HOME | End: 2018-10-25
Payer: COMMERCIAL

## 2018-10-25 VITALS
SYSTOLIC BLOOD PRESSURE: 111 MMHG | HEART RATE: 78 BPM | TEMPERATURE: 98.3 F | DIASTOLIC BLOOD PRESSURE: 76 MMHG | OXYGEN SATURATION: 96 % | RESPIRATION RATE: 16 BRPM

## 2018-10-25 PROCEDURE — 6360000002 HC RX W HCPCS: Performed by: INTERNAL MEDICINE

## 2018-10-25 PROCEDURE — 90471 IMMUNIZATION ADMIN: CPT | Performed by: INTERNAL MEDICINE

## 2018-10-25 PROCEDURE — 90675 RABIES VACCINE IM: CPT | Performed by: INTERNAL MEDICINE

## 2018-10-25 PROCEDURE — 96372 THER/PROPH/DIAG INJ SC/IM: CPT | Performed by: NURSE PRACTITIONER

## 2018-10-25 RX ADMIN — RABIES VACCINE 1 ML: KIT at 10:06

## 2018-10-25 NOTE — PLAN OF CARE
Problem: KNOWLEDGE DEFICIT  Goal: Patient/S.O. demonstrates understanding of disease process, treatment plan, medications, and discharge instructions. Intervention: ASSESS BASELINE KNOWLEDGE  Pt seen and assessed [address] [facility] today for Rabies injection. Pt tolerated infusion well and without incident. Pt verbalizes understanding of discharge instructions. Discharged ambulatory to home.

## 2018-11-01 ENCOUNTER — OFFICE VISIT (OUTPATIENT)
Dept: GYNECOLOGY | Age: 23
End: 2018-11-01
Payer: COMMERCIAL

## 2018-11-01 VITALS
WEIGHT: 154.5 LBS | HEART RATE: 62 BPM | DIASTOLIC BLOOD PRESSURE: 78 MMHG | BODY MASS INDEX: 27.38 KG/M2 | SYSTOLIC BLOOD PRESSURE: 122 MMHG | RESPIRATION RATE: 17 BRPM | HEIGHT: 63 IN

## 2018-11-01 DIAGNOSIS — Z01.419 WELL WOMAN EXAM WITH ROUTINE GYNECOLOGICAL EXAM: Primary | ICD-10-CM

## 2018-11-01 DIAGNOSIS — Z30.41 ENCOUNTER FOR SURVEILLANCE OF CONTRACEPTIVE PILLS: ICD-10-CM

## 2018-11-01 DIAGNOSIS — G43.909 MIGRAINE WITHOUT STATUS MIGRAINOSUS, NOT INTRACTABLE, UNSPECIFIED MIGRAINE TYPE: ICD-10-CM

## 2018-11-01 PROCEDURE — 99395 PREV VISIT EST AGE 18-39: CPT | Performed by: OBSTETRICS & GYNECOLOGY

## 2018-11-01 RX ORDER — DESOGESTREL AND ETHINYL ESTRADIOL 21-5 (28)
1 KIT ORAL DAILY
Qty: 3 PACKET | Refills: 3 | Status: SHIPPED | OUTPATIENT
Start: 2018-11-01 | End: 2019-09-30 | Stop reason: SDUPTHER

## 2018-11-01 ASSESSMENT — ENCOUNTER SYMPTOMS
EYES NEGATIVE: 1
RESPIRATORY NEGATIVE: 1
GASTROINTESTINAL NEGATIVE: 1

## 2018-11-07 LAB
C. TRACHOMATIS DNA,THIN PREP: NEGATIVE
N. GONORRHOEAE DNA, THIN PREP: NEGATIVE

## 2018-11-09 ENCOUNTER — OFFICE VISIT (OUTPATIENT)
Dept: DERMATOLOGY | Age: 23
End: 2018-11-09
Payer: COMMERCIAL

## 2018-11-09 DIAGNOSIS — D22.9 MULTIPLE BENIGN MELANOCYTIC NEVI: ICD-10-CM

## 2018-11-09 DIAGNOSIS — L72.0 MILIAL CYST: ICD-10-CM

## 2018-11-09 DIAGNOSIS — D48.9 NEOPLASM OF UNCERTAIN BEHAVIOR: Primary | ICD-10-CM

## 2018-11-09 DIAGNOSIS — L81.2 EPHELIDES: ICD-10-CM

## 2018-11-09 PROCEDURE — 11100 PR BIOPSY OF SKIN LESION: CPT | Performed by: DERMATOLOGY

## 2018-11-09 PROCEDURE — 99213 OFFICE O/P EST LOW 20 MIN: CPT | Performed by: DERMATOLOGY

## 2018-11-13 LAB — DERMATOLOGY PATHOLOGY REPORT: NORMAL

## 2018-11-26 RX ORDER — DROSPIRENONE, ETHINYL ESTRADIOL AND LEVOMEFOLATE CALCIUM AND LEVOMEFOLATE CALCIUM 3-0.02(24)
KIT ORAL
Qty: 28 TABLET | Refills: 9 | Status: SHIPPED | OUTPATIENT
Start: 2018-11-26 | End: 2018-12-14 | Stop reason: ALTCHOICE

## 2018-12-14 ENCOUNTER — PROCEDURE VISIT (OUTPATIENT)
Dept: DERMATOLOGY | Age: 23
End: 2018-12-14
Payer: COMMERCIAL

## 2018-12-14 DIAGNOSIS — L72.0 MILIAL CYST: Primary | ICD-10-CM

## 2018-12-14 DIAGNOSIS — R20.9 DISTURBANCE OF SKIN SENSATION: ICD-10-CM

## 2018-12-14 PROCEDURE — 10040 EXTRACTION: CPT | Performed by: DERMATOLOGY

## 2019-01-16 ENCOUNTER — OFFICE VISIT (OUTPATIENT)
Dept: PRIMARY CARE CLINIC | Age: 24
End: 2019-01-16
Payer: COMMERCIAL

## 2019-01-16 VITALS
SYSTOLIC BLOOD PRESSURE: 127 MMHG | DIASTOLIC BLOOD PRESSURE: 86 MMHG | WEIGHT: 157 LBS | HEART RATE: 73 BPM | BODY MASS INDEX: 27.82 KG/M2 | RESPIRATION RATE: 12 BRPM | HEIGHT: 63 IN | TEMPERATURE: 98.6 F

## 2019-01-16 DIAGNOSIS — R59.0 LYMPHADENOPATHY OF LEFT CERVICAL REGION: Primary | ICD-10-CM

## 2019-01-16 PROCEDURE — 99213 OFFICE O/P EST LOW 20 MIN: CPT | Performed by: PHYSICIAN ASSISTANT

## 2019-01-16 ASSESSMENT — ENCOUNTER SYMPTOMS
ABDOMINAL PAIN: 0
SINUS PRESSURE: 0
SHORTNESS OF BREATH: 0
NAUSEA: 0
EYE PAIN: 0
EYE REDNESS: 0
WHEEZING: 0
SORE THROAT: 0
COUGH: 0
BACK PAIN: 0
DIARRHEA: 0

## 2019-02-05 ENCOUNTER — OFFICE VISIT (OUTPATIENT)
Dept: ENT CLINIC | Age: 24
End: 2019-02-05
Payer: COMMERCIAL

## 2019-02-05 VITALS
WEIGHT: 160 LBS | HEART RATE: 92 BPM | HEIGHT: 64 IN | BODY MASS INDEX: 27.31 KG/M2 | SYSTOLIC BLOOD PRESSURE: 134 MMHG | DIASTOLIC BLOOD PRESSURE: 92 MMHG

## 2019-02-05 DIAGNOSIS — R59.0 CERVICAL LYMPHADENOPATHY: Primary | ICD-10-CM

## 2019-02-05 DIAGNOSIS — R59.0 LYMPHADENOPATHY OF HEAD AND NECK REGION: ICD-10-CM

## 2019-02-05 PROCEDURE — 99214 OFFICE O/P EST MOD 30 MIN: CPT | Performed by: OTOLARYNGOLOGY

## 2019-02-05 PROCEDURE — 76536 US EXAM OF HEAD AND NECK: CPT | Performed by: OTOLARYNGOLOGY

## 2019-02-05 RX ORDER — AMOXICILLIN 500 MG/1
500 CAPSULE ORAL 3 TIMES DAILY
Qty: 30 CAPSULE | Refills: 0 | Status: SHIPPED | OUTPATIENT
Start: 2019-02-05 | End: 2019-02-15

## 2019-02-05 ASSESSMENT — ENCOUNTER SYMPTOMS
CHOKING: 0
SHORTNESS OF BREATH: 0
COUGH: 0
FACIAL SWELLING: 0
EYE DISCHARGE: 0
CHEST TIGHTNESS: 0
SORE THROAT: 0
STRIDOR: 0
SINUS PAIN: 0
DIARRHEA: 0
COLOR CHANGE: 0
SINUS PRESSURE: 0
VOICE CHANGE: 0
EYE PAIN: 0
BACK PAIN: 0
TROUBLE SWALLOWING: 0
RHINORRHEA: 0
CONSTIPATION: 0
VOMITING: 0
NAUSEA: 0
WHEEZING: 0
BLOOD IN STOOL: 0
APNEA: 0

## 2019-02-18 ENCOUNTER — TELEPHONE (OUTPATIENT)
Dept: ENT CLINIC | Age: 24
End: 2019-02-18

## 2019-02-18 DIAGNOSIS — R59.1 LYMPHADENOPATHY: Primary | ICD-10-CM

## 2019-03-05 ENCOUNTER — OFFICE VISIT (OUTPATIENT)
Dept: ENT CLINIC | Age: 24
End: 2019-03-05
Payer: COMMERCIAL

## 2019-03-05 VITALS — SYSTOLIC BLOOD PRESSURE: 120 MMHG | DIASTOLIC BLOOD PRESSURE: 79 MMHG | HEART RATE: 73 BPM

## 2019-03-05 DIAGNOSIS — R59.1 LYMPHADENOPATHY: Primary | ICD-10-CM

## 2019-03-05 PROCEDURE — 99213 OFFICE O/P EST LOW 20 MIN: CPT | Performed by: OTOLARYNGOLOGY

## 2019-03-05 ASSESSMENT — ENCOUNTER SYMPTOMS
SINUS PAIN: 0
EYE REDNESS: 0
CHOKING: 0
DIARRHEA: 0
COUGH: 0
RHINORRHEA: 0
EYE ITCHING: 0
VOICE CHANGE: 0
SINUS PRESSURE: 0
EYE PAIN: 0
SORE THROAT: 0
SHORTNESS OF BREATH: 0
TROUBLE SWALLOWING: 0
NAUSEA: 0
FACIAL SWELLING: 0

## 2019-04-09 ENCOUNTER — OFFICE VISIT (OUTPATIENT)
Dept: INTERNAL MEDICINE CLINIC | Age: 24
End: 2019-04-09
Payer: COMMERCIAL

## 2019-04-09 VITALS
WEIGHT: 168.8 LBS | SYSTOLIC BLOOD PRESSURE: 122 MMHG | BODY MASS INDEX: 28.82 KG/M2 | HEART RATE: 89 BPM | DIASTOLIC BLOOD PRESSURE: 70 MMHG | OXYGEN SATURATION: 98 % | RESPIRATION RATE: 14 BRPM | HEIGHT: 64 IN

## 2019-04-09 DIAGNOSIS — F41.9 ANXIETY: Primary | ICD-10-CM

## 2019-04-09 DIAGNOSIS — Z23 NEED FOR PROPHYLACTIC VACCINATION AGAINST STREPTOCOCCUS PNEUMONIAE (PNEUMOCOCCUS): ICD-10-CM

## 2019-04-09 DIAGNOSIS — J45.990 EXERCISE-INDUCED ASTHMA: ICD-10-CM

## 2019-04-09 DIAGNOSIS — G25.0 ESSENTIAL TREMOR: ICD-10-CM

## 2019-04-09 DIAGNOSIS — Z11.4 ENCOUNTER FOR SCREENING FOR HIV: ICD-10-CM

## 2019-04-09 PROCEDURE — 90471 IMMUNIZATION ADMIN: CPT | Performed by: INTERNAL MEDICINE

## 2019-04-09 PROCEDURE — 90732 PPSV23 VACC 2 YRS+ SUBQ/IM: CPT | Performed by: INTERNAL MEDICINE

## 2019-04-09 PROCEDURE — 99214 OFFICE O/P EST MOD 30 MIN: CPT | Performed by: INTERNAL MEDICINE

## 2019-04-09 ASSESSMENT — ENCOUNTER SYMPTOMS
PHOTOPHOBIA: 0
ABDOMINAL PAIN: 0
SINUS PRESSURE: 0
BACK PAIN: 0
WHEEZING: 0
VOMITING: 0
TROUBLE SWALLOWING: 0
COUGH: 0
NAUSEA: 0
COLOR CHANGE: 0
EYE PAIN: 0
DIARRHEA: 0
SINUS PAIN: 0
CONSTIPATION: 0
SHORTNESS OF BREATH: 0

## 2019-04-09 ASSESSMENT — PATIENT HEALTH QUESTIONNAIRE - PHQ9
SUM OF ALL RESPONSES TO PHQ QUESTIONS 1-9: 0
2. FEELING DOWN, DEPRESSED OR HOPELESS: 0
1. LITTLE INTEREST OR PLEASURE IN DOING THINGS: 0
SUM OF ALL RESPONSES TO PHQ9 QUESTIONS 1 & 2: 0
SUM OF ALL RESPONSES TO PHQ QUESTIONS 1-9: 0
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
## 2019-04-09 NOTE — PATIENT INSTRUCTIONS
with a straw. When should you call for help? Watch closely for changes in your health, and be sure to contact your doctor if:    · You notice your tremors are getting worse.     · You can't do your everyday activities because of your tremors.     · You are sad and embarrassed about your shaking. Where can you learn more? Go to https://Toskpepiceweb.iChange. org and sign in to your OpenDoors.su account. Enter B746 in the comment.com box to learn more about \"Benign Essential Tremor: Care Instructions. \"     If you do not have an account, please click on the \"Sign Up Now\" link. Current as of: Lesly 3, 2018  Content Version: 11.9  © 5107-1585 [employer]. Care instructions adapted under license by [facility] ([facility]). If you have questions about a medical condition or this instruction, always ask your healthcare professional. [address] any warranty or liability for your use of this information.

## 2019-05-08 ENCOUNTER — PATIENT MESSAGE (OUTPATIENT)
Dept: INTERNAL MEDICINE CLINIC | Age: 24
End: 2019-05-08

## 2019-05-08 DIAGNOSIS — L30.9 ECZEMA, UNSPECIFIED TYPE: Primary | ICD-10-CM

## 2019-05-08 NOTE — TELEPHONE ENCOUNTER
From: Beck Jiang  To: Farhana Landaverde MD  Sent: 5/8/2019 3:41 PM EDT  Subject: Prescription Question    Good afternoon Dr. Jessica Perla,    I just requested to add a medication that I realized wasn't on my list (triamcinolone 0.1%) for eczema. Are you able to send a script for this or do I need to request from Dr. Angeles Lawson? I haven't had it refilled in awhile and I'm running low on the tube I currently have, but I think originally was sent in from my former PCP. Let me know - thanks!

## 2019-07-01 ENCOUNTER — OFFICE VISIT (OUTPATIENT)
Dept: PSYCHOLOGY | Age: 24
End: 2019-07-01
Payer: COMMERCIAL

## 2019-07-01 DIAGNOSIS — F41.9 ANXIETY DISORDER, UNSPECIFIED TYPE: Primary | ICD-10-CM

## 2019-07-01 PROCEDURE — 90791 PSYCH DIAGNOSTIC EVALUATION: CPT | Performed by: PSYCHOLOGIST
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
## 2019-07-16 ENCOUNTER — OFFICE VISIT (OUTPATIENT)
Dept: INTERNAL MEDICINE CLINIC | Age: 24
End: 2019-07-16
Payer: COMMERCIAL

## 2019-07-16 VITALS
HEART RATE: 86 BPM | SYSTOLIC BLOOD PRESSURE: 112 MMHG | BODY MASS INDEX: 29.27 KG/M2 | RESPIRATION RATE: 14 BRPM | DIASTOLIC BLOOD PRESSURE: 84 MMHG | WEIGHT: 168.4 LBS | OXYGEN SATURATION: 96 %

## 2019-07-16 DIAGNOSIS — F32.1 CURRENT MODERATE EPISODE OF MAJOR DEPRESSIVE DISORDER WITHOUT PRIOR EPISODE (HCC): Primary | ICD-10-CM

## 2019-07-16 PROCEDURE — 99214 OFFICE O/P EST MOD 30 MIN: CPT | Performed by: INTERNAL MEDICINE

## 2019-07-16 PROCEDURE — G0444 DEPRESSION SCREEN ANNUAL: HCPCS | Performed by: INTERNAL MEDICINE

## 2019-07-16 RX ORDER — ESCITALOPRAM OXALATE 20 MG/1
20 TABLET ORAL NIGHTLY
Qty: 30 TABLET | Refills: 5 | Status: SHIPPED | OUTPATIENT
Start: 2019-07-16 | End: 2019-11-12 | Stop reason: SDUPTHER

## 2019-07-16 ASSESSMENT — PATIENT HEALTH QUESTIONNAIRE - PHQ9
4. FEELING TIRED OR HAVING LITTLE ENERGY: 2
5. POOR APPETITE OR OVEREATING: 1
6. FEELING BAD ABOUT YOURSELF - OR THAT YOU ARE A FAILURE OR HAVE LET YOURSELF OR YOUR FAMILY DOWN: 0
1. LITTLE INTEREST OR PLEASURE IN DOING THINGS: 2
3. TROUBLE FALLING OR STAYING ASLEEP: 2
7. TROUBLE CONCENTRATING ON THINGS, SUCH AS READING THE NEWSPAPER OR WATCHING TELEVISION: 1
SUM OF ALL RESPONSES TO PHQ9 QUESTIONS 1 & 2: 3
SUM OF ALL RESPONSES TO PHQ QUESTIONS 1-9: 10
2. FEELING DOWN, DEPRESSED OR HOPELESS: 1
10. IF YOU CHECKED OFF ANY PROBLEMS, HOW DIFFICULT HAVE THESE PROBLEMS MADE IT FOR YOU TO DO YOUR WORK, TAKE CARE OF THINGS AT HOME, OR GET ALONG WITH OTHER PEOPLE: 2
SUM OF ALL RESPONSES TO PHQ QUESTIONS 1-9: 10
9. THOUGHTS THAT YOU WOULD BE BETTER OFF DEAD, OR OF HURTING YOURSELF: 0
8. MOVING OR SPEAKING SO SLOWLY THAT OTHER PEOPLE COULD HAVE NOTICED. OR THE OPPOSITE, BEING SO FIGETY OR RESTLESS THAT YOU HAVE BEEN MOVING AROUND A LOT MORE THAN USUAL: 1

## 2019-07-16 NOTE — PROGRESS NOTES
ideas. The patient is nervous/anxious. The patient is not hyperactive. Past Medical History:   Diagnosis Date    Exercise-induced asthma 1/21/2016    Headache     Seasonal allergic rhinitis 1/21/2016       Past Surgical History:   Procedure Laterality Date    CHALAZION EXCISION      LASIK      WISDOM TOOTH EXTRACTION         Social History     Socioeconomic History    Marital status: Single     Spouse name: Not on file    Number of children: Not on file    Years of education: Not on file    Highest education level: Not on file   Occupational History    Occupation: Jew   Social Needs    Financial resource strain: Not on file    Food insecurity:     Worry: Not on file     Inability: Not on file   [employer] needs:     Medical: Not on file     Non-medical: Not on file   Tobacco Use    Smoking status: Never Smoker    Smokeless tobacco: Never Used   Substance and Sexual Activity    Alcohol use:  Yes     Alcohol/week: 5.0 standard drinks     Types: 5 Glasses of wine per week     Comment: social    Drug use: No    Sexual activity: Yes     Partners: Male   Lifestyle    Physical activity:     Days per week: Not on file     Minutes per session: Not on file    Stress: Not on file   Relationships    Social connections:     Talks on phone: Not on file     Gets together: Not on file     Attends Christianity service: Not on file     Active member of club or organization: Not on file     Attends meetings of clubs or organizations: Not on file     Relationship status: Not on file    Intimate partner violence:     Fear of current or ex partner: Not on file     Emotionally abused: Not on file     Physically abused: Not on file     Forced sexual activity: Not on file   Other Topics Concern    Not on file   Social History Narrative    System role digital marketing team        From [employer]    Graduated [employer]        Plays soccer 4 times weekly        dairy             Current Outpatient Medications on

## 2019-07-29 ENCOUNTER — OFFICE VISIT (OUTPATIENT)
Dept: PSYCHOLOGY | Age: 24
End: 2019-07-29
Payer: COMMERCIAL

## 2019-07-29 DIAGNOSIS — F41.9 ANXIETY DISORDER, UNSPECIFIED TYPE: Primary | ICD-10-CM

## 2019-07-29 DIAGNOSIS — F32.1 CURRENT MODERATE EPISODE OF MAJOR DEPRESSIVE DISORDER WITHOUT PRIOR EPISODE (HCC): ICD-10-CM

## 2019-07-29 PROCEDURE — 90832 PSYTX W PT 30 MINUTES: CPT | Performed by: PSYCHOLOGIST

## 2019-07-29 NOTE — PROGRESS NOTES
Behavioral Health Consultation  Se Jensen, Ph.D.  Psychologist  7/29/2019  10:35 AM    Time spent with Patient: 30 minutes  This is patient's second [facility] appointment. Reason for Consult:        Chief Complaint   Patient presents with    Anxiety      Referring Provider: Jose Sanchez MD  [address], [address]    Pt provided informed consent for the behavioral health program. Discussed with patient model of service to include the limits of confidentiality (i.e. abuse reporting, suicide  intervention, etc.) and short-term intervention focused approach. Pt indicated understanding. Feedback given to PCP. S:  Pt seen per PCP re: anxiety    Pt seen for f/u. Pt stated that she feel into a depressive \"slump\" and noted sxs consistent w/ Major Depressive Disorder with a moderate presentation. Pt stated that she has been attempting to manage her presentation by setting goals each day but finds it harder to do on the weekends as well as this week at work as her boss is not in town. Used motivational interviewing to promote bx activation. Pt set related goals. Pt noted her anxiety has been much better and she has been using the body scan meditation to go to sleep each night, the calm jamey during the day, and has effectively used cognitive restructuring.        O:  MSE:    Appearance    alert, cooperative, mild distress  Impulsive behavior No  Speech    spontaneous, normal rate and normal volume  Mood    Anxious  Affect    anxiety  Thought Content    cognitive distortions  Thought Process    linear and coherent  Associations    logical connections  Insight    Good  Judgment    Intact  Orientation    oriented to person, place, time, and general circumstances  Memory    recent and remote memory intact  Attention/Concentration    intact  Morbid ideation No  Suicide Assessment    no suicidal ideation    History:    Social History:   Social History     Socioeconomic History    Marital status: Single     Spouse name: Not on file    Number of children: Not on file    Years of education: Not on file    Highest education level: Not on file   Occupational History    Occupation: Anabaptist   Social Needs    Financial resource strain: Not on file    Food insecurity:     Worry: Not on file     Inability: Not on file   [employer] needs:     Medical: Not on file     Non-medical: Not on file   Tobacco Use    Smoking status: Never Smoker    Smokeless tobacco: Never Used   Substance and Sexual Activity    Alcohol use: Yes     Alcohol/week: 5.0 standard drinks     Types: 5 Glasses of wine per week     Comment: social    Drug use: No    Sexual activity: Yes     Partners: Male   Lifestyle    Physical activity:     Days per week: Not on file     Minutes per session: Not on file    Stress: Not on file   Relationships    Social connections:     Talks on phone: Not on file     Gets together: Not on file     Attends Congregation service: Not on file     Active member of club or organization: Not on file     Attends meetings of clubs or organizations: Not on file     Relationship status: Not on file    Intimate partner violence:     Fear of current or ex partner: Not on file     Emotionally abused: Not on file     Physically abused: Not on file     Forced sexual activity: Not on file   Other Topics Concern    Not on file   Social History Narrative    System role digital marketing team        [facility] [employer]    Graduated [employer]        Plays soccer 4 times weekly        dairy         TOBACCO:   reports that she has never smoked. She has never used smokeless tobacco.  ETOH:   reports that she drinks about 5.0 standard drinks of alcohol per week. A:  Patient engaged and cooperative. Denies SI. Insight and motivation are good.        Diagnosis:    Unspecified anxiety    Plan:    Goal: Enhance capacity to manage mood  Objective 1: Learn and utilize down-regulation coping skills (e.g., deep breathing; 6 months)  Objective 2: Utilize cognitive restructuring to manage anxious cognitions (6 months)  Objective 3: Increase understanding on the physical effects of anxiety (6 months)    Goal: Improve healthy living bxs  Objective 1: Improve sleep hygiene practices (6 months)  Objective 2: Increase activity and healthy eating bxs (6 months)        Pt interventions:  Discussed various factors related to the development and maintenance of  anxiety (including biological, cognitive, behavioral, and environmental factors), Trained in strategies for increasing balanced thinking, Discussed and set plan for behavioral activation, Trained in relaxation strategies, Provided education, Discussed self-care (sleep, nutrition, rewarding activities, social support, exercise), Discussed potential barriers to change, Established rapport and Supportive techniques    Documentation was done using voice recognition dragon software. Every effort was made to ensure accuracy; however, inadvertent, unintentional computerized transcription errors may be present.

## 2019-08-12 ENCOUNTER — OFFICE VISIT (OUTPATIENT)
Dept: INTERNAL MEDICINE CLINIC | Age: 24
End: 2019-08-12
Payer: COMMERCIAL

## 2019-08-12 VITALS
DIASTOLIC BLOOD PRESSURE: 78 MMHG | OXYGEN SATURATION: 98 % | SYSTOLIC BLOOD PRESSURE: 110 MMHG | HEART RATE: 65 BPM | RESPIRATION RATE: 14 BRPM

## 2019-08-12 DIAGNOSIS — F41.8 MIXED ANXIETY AND DEPRESSIVE DISORDER: Primary | ICD-10-CM

## 2019-08-12 PROCEDURE — 99213 OFFICE O/P EST LOW 20 MIN: CPT | Performed by: INTERNAL MEDICINE

## 2019-08-12 ASSESSMENT — ENCOUNTER SYMPTOMS
DIARRHEA: 1
NAUSEA: 1
VOMITING: 0
CONSTIPATION: 0
ABDOMINAL PAIN: 0
ABDOMINAL DISTENTION: 0

## 2019-08-16 DIAGNOSIS — F32.1 CURRENT MODERATE EPISODE OF MAJOR DEPRESSIVE DISORDER WITHOUT PRIOR EPISODE (HCC): ICD-10-CM

## 2019-08-19 ENCOUNTER — OFFICE VISIT (OUTPATIENT)
Dept: PSYCHOLOGY | Age: 24
End: 2019-08-19
Payer: COMMERCIAL

## 2019-08-19 DIAGNOSIS — F41.8 DEPRESSION WITH ANXIETY: Primary | ICD-10-CM

## 2019-08-19 PROCEDURE — 90832 PSYTX W PT 30 MINUTES: CPT | Performed by: PSYCHOLOGIST

## 2019-08-19 RX ORDER — ESCITALOPRAM OXALATE 20 MG/1
20 TABLET ORAL NIGHTLY
Qty: 30 TABLET | Refills: 5 | OUTPATIENT
Start: 2019-08-19

## 2019-08-19 NOTE — PROGRESS NOTES
circumstances  Memory    recent and remote memory intact  Attention/Concentration    intact  Morbid ideation No  Suicide Assessment    no suicidal ideation    History:    Social History:   Social History     Socioeconomic History    Marital status: Single     Spouse name: Not on file    Number of children: Not on file    Years of education: Not on file    Highest education level: Not on file   Occupational History    Occupation: [employer]   Social Needs    Financial resource strain: Not on file    Food insecurity:     Worry: Not on file     Inability: Not on file   [employer] needs:     Medical: Not on file     Non-medical: Not on file   Tobacco Use    Smoking status: Never Smoker    Smokeless tobacco: Never Used   Substance and Sexual Activity    Alcohol use: Yes     Alcohol/week: 5.0 standard drinks     Types: 5 Glasses of wine per week     Comment: social    Drug use: No    Sexual activity: Yes     Partners: Male   Lifestyle    Physical activity:     Days per week: Not on file     Minutes per session: Not on file    Stress: Not on file   Relationships    Social connections:     Talks on phone: Not on file     Gets together: Not on file     Attends Rastafarian service: Not on file     Active member of club or organization: Not on file     Attends meetings of clubs or organizations: Not on file     Relationship status: Not on file    Intimate partner violence:     Fear of current or ex partner: Not on file     Emotionally abused: Not on file     Physically abused: Not on file     Forced sexual activity: Not on file   Other Topics Concern    Not on file   Social History Narrative    System role digital marketing team        From [employer]    Graduated [employer]        Plays soccer 4 times weekly        dairy         TOBACCO:   reports that she has never smoked. She has never used smokeless tobacco.  ETOH:   reports that she drinks about 5.0 standard drinks of alcohol per week.     A:  Patient engaged and

## 2019-09-30 DIAGNOSIS — Z30.41 ENCOUNTER FOR SURVEILLANCE OF CONTRACEPTIVE PILLS: ICD-10-CM

## 2019-09-30 RX ORDER — DESOGESTREL AND ETHINYL ESTRADIOL AND ETHINYL ESTRADIOL 21-5 (28)
KIT ORAL
Qty: 84 TABLET | Refills: 2 | Status: SHIPPED | OUTPATIENT
Start: 2019-09-30 | End: 2019-12-03 | Stop reason: SDUPTHER

## 2019-10-14 ENCOUNTER — OFFICE VISIT (OUTPATIENT)
Dept: INTERNAL MEDICINE CLINIC | Age: 24
End: 2019-10-14
Payer: COMMERCIAL

## 2019-10-14 VITALS
SYSTOLIC BLOOD PRESSURE: 116 MMHG | DIASTOLIC BLOOD PRESSURE: 84 MMHG | HEART RATE: 78 BPM | RESPIRATION RATE: 14 BRPM | WEIGHT: 168.6 LBS | BODY MASS INDEX: 28.79 KG/M2 | HEIGHT: 64 IN | OXYGEN SATURATION: 97 %

## 2019-10-14 DIAGNOSIS — Z00.00 ANNUAL PHYSICAL EXAM: Primary | ICD-10-CM

## 2019-10-14 DIAGNOSIS — Z23 NEED FOR INFLUENZA VACCINATION: ICD-10-CM

## 2019-10-14 DIAGNOSIS — F41.8 MIXED ANXIETY AND DEPRESSIVE DISORDER: ICD-10-CM

## 2019-10-14 PROCEDURE — 99395 PREV VISIT EST AGE 18-39: CPT | Performed by: INTERNAL MEDICINE

## 2019-10-14 PROCEDURE — 90471 IMMUNIZATION ADMIN: CPT | Performed by: INTERNAL MEDICINE

## 2019-10-14 PROCEDURE — 90686 IIV4 VACC NO PRSV 0.5 ML IM: CPT | Performed by: INTERNAL MEDICINE

## 2019-11-12 ENCOUNTER — OFFICE VISIT (OUTPATIENT)
Dept: DERMATOLOGY | Age: 24
End: 2019-11-12
Payer: COMMERCIAL

## 2019-11-12 DIAGNOSIS — L98.9 FINGER LESION: ICD-10-CM

## 2019-11-12 DIAGNOSIS — D36.9 ANGIOFIBROMA: Primary | ICD-10-CM

## 2019-11-12 DIAGNOSIS — D22.9 MULTIPLE BENIGN MELANOCYTIC NEVI: ICD-10-CM

## 2019-11-12 DIAGNOSIS — F32.1 CURRENT MODERATE EPISODE OF MAJOR DEPRESSIVE DISORDER WITHOUT PRIOR EPISODE (HCC): ICD-10-CM

## 2019-11-12 PROCEDURE — 99213 OFFICE O/P EST LOW 20 MIN: CPT | Performed by: DERMATOLOGY

## 2019-11-12 RX ORDER — ESCITALOPRAM OXALATE 20 MG/1
20 TABLET ORAL NIGHTLY
Qty: 30 TABLET | Refills: 5 | Status: SHIPPED | OUTPATIENT
Start: 2019-11-12 | End: 2020-03-24 | Stop reason: SDUPTHER

## 2019-12-03 ENCOUNTER — OFFICE VISIT (OUTPATIENT)
Dept: GYNECOLOGY | Age: 24
End: 2019-12-03
Payer: COMMERCIAL

## 2019-12-03 VITALS
DIASTOLIC BLOOD PRESSURE: 89 MMHG | HEIGHT: 63 IN | WEIGHT: 174.6 LBS | BODY MASS INDEX: 30.94 KG/M2 | RESPIRATION RATE: 17 BRPM | HEART RATE: 93 BPM | SYSTOLIC BLOOD PRESSURE: 123 MMHG

## 2019-12-03 DIAGNOSIS — Z01.419 WELL WOMAN EXAM WITH ROUTINE GYNECOLOGICAL EXAM: Primary | ICD-10-CM

## 2019-12-03 DIAGNOSIS — Z30.41 ENCOUNTER FOR SURVEILLANCE OF CONTRACEPTIVE PILLS: ICD-10-CM

## 2019-12-03 PROCEDURE — 99395 PREV VISIT EST AGE 18-39: CPT | Performed by: OBSTETRICS & GYNECOLOGY

## 2019-12-03 RX ORDER — DESOGESTREL AND ETHINYL ESTRADIOL 21-5 (28)
1 KIT ORAL DAILY
Qty: 84 TABLET | Refills: 3 | Status: SHIPPED | OUTPATIENT
Start: 2019-12-03 | End: 2020-10-15 | Stop reason: ALTCHOICE

## 2019-12-03 ASSESSMENT — ENCOUNTER SYMPTOMS
RESPIRATORY NEGATIVE: 1
GASTROINTESTINAL NEGATIVE: 1
EYES NEGATIVE: 1

## 2020-03-25 RX ORDER — ESCITALOPRAM OXALATE 20 MG/1
20 TABLET ORAL NIGHTLY
Qty: 30 TABLET | Refills: 5 | Status: SHIPPED | OUTPATIENT
Start: 2020-03-25 | End: 2020-03-30 | Stop reason: SDUPTHER

## 2020-03-26 ENCOUNTER — TELEPHONE (OUTPATIENT)
Dept: INTERNAL MEDICINE CLINIC | Age: 25
End: 2020-03-26

## 2020-03-27 ENCOUNTER — PATIENT MESSAGE (OUTPATIENT)
Dept: INTERNAL MEDICINE CLINIC | Age: 25
End: 2020-03-27

## 2020-03-30 RX ORDER — ESCITALOPRAM OXALATE 20 MG/1
20 TABLET ORAL NIGHTLY
Qty: 90 TABLET | Refills: 1 | Status: SHIPPED | OUTPATIENT
Start: 2020-03-30 | End: 2020-07-15 | Stop reason: SDUPTHER

## 2020-03-30 NOTE — TELEPHONE ENCOUNTER
From: Leola Galindo  To: Tolu Sarmiento MD  Sent: 3/27/2020 6:45 PM EDT  Subject: Prescription Question    Hi Dr Denia Brody, I recently had you refill my Lexapro (generic) prescription, sending it to the [address] since [facility] is closed. I forgot that [facility] are not in network with our insurance. It looks like [facility] pharmacy is still open, would it be possible to have this prescription sent there? Thanks so much!

## 2020-04-13 ENCOUNTER — TELEMEDICINE (OUTPATIENT)
Dept: INTERNAL MEDICINE CLINIC | Age: 25
End: 2020-04-13
Payer: COMMERCIAL

## 2020-04-13 PROCEDURE — 99213 OFFICE O/P EST LOW 20 MIN: CPT | Performed by: INTERNAL MEDICINE

## 2020-04-13 NOTE — PROGRESS NOTES
Patient and provider were located at their individual homes. --Jenn Brandt MD on 4/13/2020 at 2:11 PM    An electronic signature was used to authenticate this note.

## 2020-07-16 RX ORDER — ESCITALOPRAM OXALATE 20 MG/1
20 TABLET ORAL NIGHTLY
Qty: 90 TABLET | Refills: 1 | Status: SHIPPED | OUTPATIENT
Start: 2020-07-16 | End: 2020-10-17 | Stop reason: SDUPTHER

## 2020-07-17 ENCOUNTER — OFFICE VISIT (OUTPATIENT)
Dept: PRIMARY CARE CLINIC | Age: 25
End: 2020-07-17
Payer: COMMERCIAL

## 2020-07-17 PROCEDURE — 99211 OFF/OP EST MAY X REQ PHY/QHP: CPT | Performed by: NURSE PRACTITIONER

## 2020-07-17 NOTE — PROGRESS NOTES
Tamica Pearce received a viral test for COVID-19. They were educated on isolation and quarantine as appropriate. For any symptoms, they were directed to seek care from their PCP, given contact information to establish with a doctor, directed to an urgent care or the emergency room.

## 2020-07-23 LAB
SARS-COV-2: NOT DETECTED
SOURCE: NORMAL

## 2020-07-23 NOTE — RESULT ENCOUNTER NOTE
Your test for COVID-19, also known as novel coronavirus, came back negative. No virus was detected from the sample collected. Testing is not 100%. Until your symptoms are fully resolved, you may still be contagious. We recommend that you remain isolated for 7 days minimum or 72 hours after your symptoms have completely resolved, whichever is longer. If you were exposed to a known positive COVID-19 patient, then you must remain isolated for 14 days. If you were tested for a pre-op, then you remain in isolated until your procedure. Continually monitor symptoms. Contact a medical provider if symptoms are worsening. If you have any additional questions, contact your PCP.     For additional information, please visit the Centers for Disease Control and Prevention [email or web address]

## 2020-08-11 ENCOUNTER — EMPLOYEE WELLNESS (OUTPATIENT)
Dept: OTHER | Age: 25
End: 2020-08-11

## 2020-08-12 LAB
CHOLESTEROL, TOTAL: 170 MG/DL (ref 0–199)
GLUCOSE BLD-MCNC: 62 MG/DL (ref 70–99)
HDLC SERPL-MCNC: 47 MG/DL (ref 40–60)
LDL CHOLESTEROL CALCULATED: 107 MG/DL
TRIGL SERPL-MCNC: 82 MG/DL (ref 0–150)

## 2020-10-15 ENCOUNTER — OFFICE VISIT (OUTPATIENT)
Dept: INTERNAL MEDICINE CLINIC | Age: 25
End: 2020-10-15
Payer: COMMERCIAL

## 2020-10-15 VITALS
SYSTOLIC BLOOD PRESSURE: 100 MMHG | DIASTOLIC BLOOD PRESSURE: 70 MMHG | HEART RATE: 67 BPM | RESPIRATION RATE: 14 BRPM | OXYGEN SATURATION: 97 % | TEMPERATURE: 97.9 F | WEIGHT: 183.8 LBS | HEIGHT: 63 IN | BODY MASS INDEX: 32.57 KG/M2

## 2020-10-15 PROCEDURE — 90686 IIV4 VACC NO PRSV 0.5 ML IM: CPT | Performed by: INTERNAL MEDICINE

## 2020-10-15 PROCEDURE — 99395 PREV VISIT EST AGE 18-39: CPT | Performed by: INTERNAL MEDICINE

## 2020-10-15 PROCEDURE — 90471 IMMUNIZATION ADMIN: CPT | Performed by: INTERNAL MEDICINE

## 2020-10-15 ASSESSMENT — ENCOUNTER SYMPTOMS
WHEEZING: 0
TROUBLE SWALLOWING: 0
COUGH: 0
SHORTNESS OF BREATH: 0
COLOR CHANGE: 0
BACK PAIN: 0
EYE PAIN: 0
NAUSEA: 0
DIARRHEA: 0
ABDOMINAL PAIN: 0
CONSTIPATION: 0
SINUS PAIN: 0
PHOTOPHOBIA: 0
VOMITING: 0
SINUS PRESSURE: 0

## 2020-10-15 NOTE — PROGRESS NOTES
Vaccine Information Sheet, \"Influenza - Inactivated\"  given to Hayde Jiménez, or parent/legal guardian of  Hayde Jiménez and verbalized understanding. Patient responses:    Have you ever had a reaction to a flu vaccine? No  Do you have any current illness? No  Have you ever had [place] Syndrome? No  Do you have a serious allergy to any of the follow: Neomycin, Polymyxin, Thimerosal, eggs or egg products? No    Flu vaccine given per order. Please see immunization tab. Risks and benefits explained. Current VIS given.       Immunizations Administered     Name Date Dose Route    Influenza, Quadv, IM, PF (6 mo and older Fluzone, Flulaval, Fluarix, and 3 yrs and older Afluria) 10/15/2020 0.5 mL Intramuscular    Site: Deltoid- Left    Lot: [id number]    NDC: 05450-105-77

## 2020-10-15 NOTE — PROGRESS NOTES
History and Physical      Pao Chavez  YOB: 1995    Date of Service:  10/15/2020    Vitals:    10/15/20 0855   BP: 100/70   Pulse: 67   Resp: 14   Temp: 97.9 °F (36.6 °C)   TempSrc: Temporal   SpO2: 97%   Weight: 183 lb 12.8 oz (83.4 kg)   Height: 5' 3\" (1.6 m)     Body mass index is 32.56 kg/m². Wt Readings from Last 3 Encounters:   10/15/20 183 lb 12.8 oz (83.4 kg)   08/11/20 179 lb (81.2 kg)   12/03/19 174 lb 9.6 oz (79.2 kg)     BP Readings from Last 3 Encounters:   10/15/20 100/70   12/03/19 123/89   10/14/19 116/84       Chief Complaint: Pao Chavez is a 30 y.o. female who presents for complete physical examination. HPI:     Stopped OCPs in January. Periods have been slightly irregular since then. Patient Active Problem List   Diagnosis    Seasonal allergic rhinitis    Exercise-induced asthma    Eczema    Chronic migraine    Mixed anxiety and depressive disorder       Allergies   Allergen Reactions    Sulfa Antibiotics Hives     Outpatient Medications Marked as Taking for the 10/15/20 encounter (Office Visit) with Matthias Whitten MD   Medication Sig Dispense Refill    escitalopram (LEXAPRO) 20 MG tablet Take 1 tablet by mouth nightly 90 tablet 1    SUMAtriptan (IMITREX) 100 MG tablet 1 tab po prn migraine.  May repeat in 1 hour for max dose 200 mg 24 hour period 27 tablet 3    albuterol sulfate HFA (PROVENTIL HFA) 108 (90 Base) MCG/ACT inhaler Inhale 2 puffs into the lungs every 6 hours as needed for Wheezing 1 Inhaler 3       Past Medical History:   Diagnosis Date    Chicken pox     Exercise-induced asthma 1/21/2016    Headache     Mixed anxiety and depressive disorder 10/14/2019    Seasonal allergic rhinitis 1/21/2016     Past Surgical History:   Procedure Laterality Date    CHALAZION EXCISION      LASIK      WISDOM TOOTH EXTRACTION          Family History   Problem Relation Age of Onset    Coronary Art Dis Maternal Grandfather     COPD Maternal Grandfather Mouth/Throat:      Mouth: Mucous membranes are moist.      Pharynx: Oropharynx is clear. Eyes:      General: No scleral icterus. Conjunctiva/sclera: Conjunctivae normal.   Neck:      Musculoskeletal: Normal range of motion. Cardiovascular:      Rate and Rhythm: Normal rate and regular rhythm. Pulses: Normal pulses. Heart sounds: Normal heart sounds. No murmur. No friction rub. No gallop. Pulmonary:      Effort: Pulmonary effort is normal.      Breath sounds: Normal breath sounds. No wheezing, rhonchi or rales. Abdominal:      General: Abdomen is flat. Bowel sounds are normal.   Musculoskeletal:         General: No deformity. Right lower leg: No edema. Left lower leg: No edema. Skin:     General: Skin is warm and dry. Findings: No rash. Neurological:      General: No focal deficit present. Mental Status: She is alert. Coordination: Coordination normal.      Gait: Gait normal.   Psychiatric:         Mood and Affect: Mood normal.         Behavior: Behavior normal.         Lipid panel:   Lab Results   Component Value Date    CHOL 170 08/11/2020    TRIG 82 08/11/2020    HDL 47 08/11/2020    LDLCALC 107 (H) 08/11/2020     Glucose:   Glucose (mg/dL)   Date Value   08/11/2020 62 (L)       No results found for this visit on 10/15/20. Preventive Care:  Hx abnormal PAP: no  Self-breast exams: yes  Eye exam within the past 2 years: yes  Dental exam every 6 months: yes  Seatbelt used consistently: yes  Smoke and carbon monoxidedetectors in home: yes   Exercise: treadmill a few times per week.  HIIT on [employer] jamey  Social History     Socioeconomic History    Marital status: Single     Spouse name: None    Number of children: None    Years of education: None    Highest education level: None   Occupational History    Occupation: Denominational   Social Needs    Financial resource strain: None    Food insecurity     Worry: None     Inability: None    Transportation needs Medical: None     Non-medical: None   Tobacco Use    Smoking status: Never Smoker    Smokeless tobacco: Never Used   Substance and Sexual Activity    Alcohol use:  Yes     Alcohol/week: 5.0 standard drinks     Types: 5 Glasses of wine per week     Comment: social    Drug use: No    Sexual activity: Yes     Partners: Male   Lifestyle    Physical activity     Days per week: None     Minutes per session: None    Stress: None   Relationships    Social connections     Talks on phone: None     Gets together: None     Attends [employer] service: None     Active member of club or organization: None     Attends meetings of clubs or organizations: None     Relationship status: None    Intimate partner violence     Fear of current or ex partner: None     Emotionally abused: None     Physically abused: None     Forced sexual activity: None   Other Topics Concern    None   Social History Narrative    System role digital marketing team        From [employer]    Graduated [employer]        Plays soccer 4 times weekly        dairy         Social History     Substance and Sexual Activity   Sexual Activity Yes    Partners: Male       Advance Directive: N, <no information>    Immunization History   Administered Date(s) Administered    HIB PRP-T (ActHIB, Hiberix) 1995, 1995, 1993, 09/23/1994    HPV 9-valent Richard Sims) 10/14/2019, 12/12/2019, 06/15/2020    Hepatitis B (Engerix-B) 1995, 1995, 03/11/1994    Influenza, Quadv, IM, PF (6 mo and older Fluzone, Flulaval, Fluarix, and 3 yrs and older Afluria) 10/10/2017, 10/08/2018, 10/14/2019    MMR 09/23/1994, 08/24/1999    Meningococcal MCV4P (Menactra) 07/03/2008    PPD Test 06/06/1994, 05/26/1998    Pneumococcal Polysaccharide ([id number]) 04/09/2019    Polio OPV 1993, 1993, 12/23/1994, 05/26/1998    Rabies 10/10/2018, 10/15/2018, 10/18/2018, 10/25/2018    Tdap (Boostrix, Adacel) 1993, 1993, 1993, 12/23/1994, 05/26/1998, 07/03/2008, 08/11/2011, 10/10/2017       Health Maintenance   Topic Date Due    Varicella vaccine (1 of 2 - 2-dose childhood series) 05/21/1994    Flu vaccine (1) 09/01/2020    Cervical cancer screen  12/03/2022    DTaP/Tdap/Td vaccine (4 - Td) 10/10/2027    Hepatitis B vaccine  Completed    Hib vaccine  Completed    Pneumococcal 0-64 years Vaccine  Completed    HIV screen  Completed    Hepatitis A vaccine  Aged Out    Meningococcal (ACWY) vaccine  Aged Out        Assessment/Plan:  Sheron Alvarado was seen today for annual exam.    Diagnoses and all orders for this visit:    Annual physical exam  Reviewed be well screening - cholesterol up slightly - less exercise than usual. Recommend increase exercise  Monitor periods for return to regularity. Work up if none. Flu shot today. Discussed sun protection  Mental health doing well. Need for influenza vaccination  -     INFLUENZA, QUADV, 3 YRS AND OLDER, IM PF, PREFILL SYR OR SDV, 0.5ML (AFLURIA QUADV, PF)              Return in about 6 months (around 4/15/2021) for chronic conditions.

## 2020-10-19 VITALS — WEIGHT: 179 LBS | BODY MASS INDEX: 31.71 KG/M2

## 2020-10-19 RX ORDER — ESCITALOPRAM OXALATE 20 MG/1
20 TABLET ORAL NIGHTLY
Qty: 90 TABLET | Refills: 1 | Status: SHIPPED | OUTPATIENT
Start: 2020-10-19 | End: 2021-08-15 | Stop reason: SDUPTHER

## 2020-10-23 ENCOUNTER — OFFICE VISIT (OUTPATIENT)
Dept: DERMATOLOGY | Age: 25
End: 2020-10-23
Payer: COMMERCIAL

## 2020-10-23 VITALS — TEMPERATURE: 97.3 F

## 2020-10-23 PROCEDURE — 99213 OFFICE O/P EST LOW 20 MIN: CPT | Performed by: DERMATOLOGY

## 2020-10-23 NOTE — PROGRESS NOTES
[facility]) Dermatology  [facility] [place]       Mónica Matias  1995    30 y.o. female     Date of Visit: 10/23/2020    Chief Complaint:   Chief Complaint   Patient presents with    Skin Exam     tbse, moles        I was asked to see this patient by Dr. Shah ref. provider found. History of Present Illness:  Mónica Matias is a 30 y.o. female who presents with the chief complaint of the followin. Total body skin cancer screening exam. Many year history of multiple nevi on the head/neck, trunk and extremities, all present for many years. Denies new moles. Denies moles changing in size, shape, color. None associated w/ pain, bleeding, pruritus. 2.  Patient complains of 2 raised red bumps located to left lateral inferior back and left lateral abdomen present for several months. States they will occasionally bleed as scratched or rubbed with clothing. Interested in discussing treatment options today. Admits to a history of sun exposure without sunscreen or protective hats/clothing.  Does currently wear sunscreen when outdoors for extended period of time.     Review of Systems:  Constitutional: Reports general sense of well-being   Skin: No new or changing moles, no tendency to develop thick scars, no interval of severe sunburns  Heme: No abnormal bruising or bleeding. Past Medical History, Family History, Surgical History, Medications and Allergies reviewed.     Past Skin Hx:  -2018-history of milial cyst to right nasal sidewall status post milia extraction  Patient denies past history of melanoma, NMSC, dysplastic nevi, or chronic skin rashes.     PFHx: Denies hx of MM or NMSC    Family History   Problem Relation Age of Onset    Coronary Art Dis Maternal Grandfather     COPD Maternal Grandfather     Parkinsonism Maternal Grandfather     Hypertension Maternal Grandmother     No Known Problems Mother     No Known Problems Father     Breast Cancer Maternal Great Grandmother     Colon Cancer Other         maternal great aunt    Heart Disease Maternal Great Grandmother         arrhythmia     Past Medical History:   Diagnosis Date    Chicken pox     Exercise-induced asthma 1/21/2016    Headache     Mixed anxiety and depressive disorder 10/14/2019    Seasonal allergic rhinitis 1/21/2016     Past Surgical History:   Procedure Laterality Date    CHALAZION EXCISION      LASIK      WISDOM TOOTH EXTRACTION         Allergies   Allergen Reactions    Sulfa Antibiotics Hives     Outpatient Medications Marked as Taking for the 10/23/20 encounter (Office Visit) with Denzel Kim, DO   Medication Sig Dispense Refill    escitalopram (LEXAPRO) 20 MG tablet Take 1 tablet by mouth nightly 90 tablet 1    SUMAtriptan (IMITREX) 100 MG tablet 1 tab po prn migraine. May repeat in 1 hour for max dose 200 mg 24 hour period 27 tablet 3    albuterol sulfate HFA (PROVENTIL HFA) 108 (90 Base) MCG/ACT inhaler Inhale 2 puffs into the lungs every 6 hours as needed for Wheezing 1 Inhaler 3       Social History:   Social History     Socioeconomic History    Marital status: Single     Spouse name: Not on file    Number of children: Not on file    Years of education: Not on file    Highest education level: Not on file   Occupational History    Occupation: [employer]   Social Needs    Financial resource strain: Not on file    Food insecurity     Worry: Not on file     Inability: Not on file   [employer] needs     Medical: Not on file     Non-medical: Not on file   Tobacco Use    Smoking status: Never Smoker    Smokeless tobacco: Never Used   Substance and Sexual Activity    Alcohol use:  Yes     Alcohol/week: 5.0 standard drinks     Types: 5 Glasses of wine per week     Comment: social    Drug use: No    Sexual activity: Yes     Partners: Male   Lifestyle    Physical activity     Days per week: Not on file     Minutes per session: Not on file    Stress: Not on file Relationships    Social connections     Talks on phone: Not on file     Gets together: Not on file     Attends Pentecostal service: Not on file     Active member of club or organization: Not on file     Attends meetings of clubs or organizations: Not on file     Relationship status: Not on file    Intimate partner violence     Fear of current or ex partner: Not on file     Emotionally abused: Not on file     Physically abused: Not on file     Forced sexual activity: Not on file   Other Topics Concern    Not on file   Social History Narrative    System role digital marketing team        From [facility]    Graduated [employer]        Plays soccer 4 times weekly        dairy           Physical Examination     The following were examined and determined to be normal: Psych/Neuro, Scalp/hair, Head/face, Conjunctivae/eyelids, Gums/teeth/lips, Neck, Breast/axilla/chest, Abdomen, Back, RUE, LUE, RLE, LLE and Nails/digits. buttocks. Areas covered by underwear garment(s) not examined. The following were examined and determined to be abnormal: none. Olsen phototype: 2    -Constitutional: Well appearing, no acute distress  -Neurological: Alert and oriented X 3  -Mood and Affect: Pleasant  Total body skin exam performed, areas examined listed above:   1. Scattered on the head,neck, trunk and extremities are multiple well-defined round and oval symmetric smoothly-bordered uniformly brown macules and papules. no change in size/shape/color of any lesions; no bleeding lesions. 2. Bright red well circumscribed papule(s) located to left lateral inferior back and left lateral abdomen     Assessment and Plan     1. Multiple benign nevi    2. Cherry angioma        1.  Multiple benign nevi  Benign acquired melanocytic nevi  -Recommend monthly self skin exams   -Educated regarding the ABCDEs of melanoma detection   -Call for any new/changing moles or concerning lesions  -Reviewed sun protective behavior -- sun avoidance during the peak hours of the day, sun-protective clothing (including hat and sunglasses), sunscreen use (water resistant, broad spectrum, SPF at least 30, need for reapplication every 1.5 to 2 hours), avoidance of tanning beds   -Plan: Observation with annual skin checks (earlier if indicated) performed in office to monitor current nevi and to assess for new lesions. 2. Cherry angioma  -Educated patient on benign nature and the potential to develop more cherry angiomas as one ages. -Due to benign nature, no treatment is necessary. Reassurance and observation recommended.     -Risk versus benefits discussed regarding shave removal (scarring, bleeding, risk of recurrence compared to Vbeam) versus Vbeam PDL laser treatment- will likely require average 3 monthly treatments  Discussed risks v. Benefits and side effects such as bruising, burning, blistering, swelling, dyspigmentation, etc.   -$100/ treatment for 2 cherry angiomas  -Discussed options for cosmetic tx and associated out of pocket fee. Patient is to call office and schedule cosmetic visit if desired prn.    -Patient plans for upcoming 6-week vacation to [place] and [facility] she will call the office when she returns with her treatment decision. Return to Clinic: PRN 15 min vbeam cherry angioma v. Shave removal; 1 year skin exam  Discussed plan with patient and/or primary caretaker. Patient to call clinic with any questions / concerns. Reviewed proper use and side effects of treatment(s) and/or medication(s) with patient and/or primary caretaker. AVS provided or is available on [facility]     Note is transcribed using voice recognition software. Inadvertent computerized transcription errors may be present.

## 2020-10-23 NOTE — PATIENT INSTRUCTIONS
Sun Protection Tips    Apply broad spectrum water resistant sunscreen with an SPF of at least 30 to exposed areas of the skin. Dont forget the ears and lips! Remember to reapply sunscreen about every 2 hours and after swimming or sweating. Wear sun protective clothing. Swim shirts (aka. rash guards) are a great idea and negates the need to reapply sunscreen in those areas. Seek the shade whenever possible especially between the hours of 10am and 4pm when the suns rays are the strongest.     Avoid tanning beds          Wear a wide brim hat while in the sun        Thanks for your visit! Feel free to send  Dr. Sahil Jensen assistant, Zahra, a [employer] message for any questions, concerns or  to schedule your cosmetic procedures.

## 2020-12-07 ENCOUNTER — OFFICE VISIT (OUTPATIENT)
Dept: GYNECOLOGY | Age: 25
End: 2020-12-07
Payer: COMMERCIAL

## 2020-12-07 VITALS
TEMPERATURE: 97.2 F | OXYGEN SATURATION: 98 % | BODY MASS INDEX: 33.66 KG/M2 | HEART RATE: 62 BPM | WEIGHT: 190 LBS | SYSTOLIC BLOOD PRESSURE: 110 MMHG | DIASTOLIC BLOOD PRESSURE: 80 MMHG | HEIGHT: 63 IN

## 2020-12-07 PROCEDURE — 99385 PREV VISIT NEW AGE 18-39: CPT | Performed by: OBSTETRICS & GYNECOLOGY

## 2020-12-07 ASSESSMENT — ENCOUNTER SYMPTOMS
NAUSEA: 0
COLOR CHANGE: 0
PHOTOPHOBIA: 0
CHEST TIGHTNESS: 0
RECTAL PAIN: 0
TROUBLE SWALLOWING: 0
BACK PAIN: 0
BLOOD IN STOOL: 0
COUGH: 0
DIARRHEA: 0
VOMITING: 0
SHORTNESS OF BREATH: 0
ABDOMINAL DISTENTION: 0
WHEEZING: 0
CONSTIPATION: 0
ABDOMINAL PAIN: 0
SORE THROAT: 0
ANAL BLEEDING: 0
APNEA: 0

## 2020-12-07 NOTE — PROGRESS NOTES
Review of Systems:  Review of Systems   Constitutional: Negative for appetite change, chills, fatigue, fever and unexpected weight change. HENT: Negative for ear pain, hearing loss, mouth sores, sore throat and trouble swallowing. Eyes: Negative for photophobia and visual disturbance. Respiratory: Negative for apnea, cough, chest tightness, shortness of breath and wheezing. Cardiovascular: Negative for chest pain, palpitations and leg swelling. Gastrointestinal: Negative for abdominal distention, abdominal pain, anal bleeding, blood in stool, constipation, diarrhea, nausea, rectal pain and vomiting. Endocrine: Negative for cold intolerance, heat intolerance, polydipsia, polyphagia and polyuria. Genitourinary: Negative for difficulty urinating, dyspareunia, dysuria, enuresis, frequency, genital sores, hematuria, menstrual problem, pelvic pain, urgency, vaginal bleeding, vaginal discharge and vaginal pain. Musculoskeletal: Negative for arthralgias, back pain, joint swelling and myalgias. Skin: Negative for color change and rash. Neurological: Negative for dizziness, seizures, syncope, light-headedness and headaches. Psychiatric/Behavioral: Negative for agitation, behavioral problems, confusion, decreased concentration, dysphoric mood, hallucinations, self-injury, sleep disturbance and suicidal ideas. The patient is not nervous/anxious and is not hyperactive. Physical Exam:  /80 (Site: Left Upper Arm, Position: Sitting, Cuff Size: Large Adult)   Pulse 62   Temp 97.2 °F (36.2 °C)   Ht 5' 3\" (1.6 m)   Wt 190 lb (86.2 kg)   LMP 11/17/2020   SpO2 98%   BMI 33.66 kg/m²   BP Readings from Last 3 Encounters:   12/07/20 110/80   10/15/20 100/70   12/03/19 123/89     Body mass index is 33.66 kg/m². Physical Exam  Constitutional:       General: She is not in acute distress. Appearance: She is well-developed. HENT:      Head: Normocephalic and atraumatic. Mouth/Throat:      Pharynx: No oropharyngeal exudate. Eyes:      General: No scleral icterus. Right eye: No discharge. Left eye: No discharge. Conjunctiva/sclera: Conjunctivae normal.   Neck:      Thyroid: No thyromegaly. Cardiovascular:      Rate and Rhythm: Normal rate and regular rhythm. Heart sounds: Normal heart sounds. Pulmonary:      Effort: Pulmonary effort is normal.      Breath sounds: Normal breath sounds. Abdominal:      General: Bowel sounds are normal. There is no distension. Palpations: Abdomen is soft. There is no mass. Tenderness: There is no abdominal tenderness. There is no guarding or rebound. Genitourinary:     Labia:         Right: No rash, tenderness, lesion or injury. Left: No rash, tenderness, lesion or injury. Vagina: Normal. No signs of injury and foreign body. No vaginal discharge, erythema or tenderness. Cervix: No cervical motion tenderness, discharge or friability. Uterus: Not deviated, not enlarged, not fixed and not tender. Adnexa:         Right: No mass, tenderness or fullness. Left: No mass, tenderness or fullness. Rectum: No mass or external hemorrhoid. Skin:     General: Skin is warm. Coloration: Skin is not pale. Findings: No erythema or rash. Neurological:      Mental Status: She is alert. Psychiatric:         Behavior: Behavior normal. Behavior is cooperative. Thought Content: Thought content normal.         Judgment: Judgment normal.           Assessment/Plan:  1.  Well woman exam with routine gynecological exam  - PAP SMEAR  Self breast exam discussed and encouraged  Diet and exercise discussed  Discussed daily multi-vitamin and adequate calcium and vitamin D in diet  Safe sex and usage of condoms discussed   BCM - condoms, declined other options     Return if symptoms worsen or fail to improve, for "[employer].

## 2020-12-23 ENCOUNTER — E-VISIT (OUTPATIENT)
Dept: INTERNAL MEDICINE CLINIC | Age: 25
End: 2020-12-23
Payer: COMMERCIAL

## 2020-12-23 PROCEDURE — 99211 OFF/OP EST MAY X REQ PHY/QHP: CPT | Performed by: INTERNAL MEDICINE

## 2020-12-24 NOTE — PROGRESS NOTES
5-10 minutes were spent on the digital evaluation and management of this patient. Diagnoses and all orders for this visit:    Skin lesion of lower extremity    The lesion doesn't look infected. Keep an eye on it. If not better, please schedule for an in person or a virtual video visit instead of an Evisit so you can interact with your provider to better diagnose and treat your symptoms.

## 2021-01-25 DIAGNOSIS — F32.1 CURRENT MODERATE EPISODE OF MAJOR DEPRESSIVE DISORDER WITHOUT PRIOR EPISODE (HCC): ICD-10-CM

## 2021-01-25 RX ORDER — ESCITALOPRAM OXALATE 20 MG/1
20 TABLET ORAL NIGHTLY
Qty: 90 TABLET | Refills: 1 | OUTPATIENT
Start: 2021-01-25

## 2021-06-21 NOTE — PATIENT INSTRUCTIONS
Sun Protection Tips    Apply broad spectrum water resistant sunscreen with an SPF of at least 30 to exposed areas of the skin. Dont forget the ears and lips! Remember to reapply sunscreen about every 2 hours and after swimming or sweating. Wear sun protective clothing. Swim shirts (aka. rash guards) are a great idea and negates the need to reapply sunscreen in those areas. Seek the shade whenever possible especially between the hours of 10am and 4pm when the suns rays are the strongest.     Avoid tanning beds          Wear a wide brim hat while in the sun        Thanks for your visit! Feel free to call/[facility] message  Dr. Edward David assistantZahra if you have questions, concerns or to schedule your cosmetic procedures.        T, Th, Sat-  T-gel shampoo   M, W, F- Rx: ketoconazole shampoo

## 2021-06-22 ENCOUNTER — TELEPHONE (OUTPATIENT)
Dept: DERMATOLOGY | Age: 26
End: 2021-06-22

## 2021-06-22 ENCOUNTER — OFFICE VISIT (OUTPATIENT)
Dept: DERMATOLOGY | Age: 26
End: 2021-06-22
Payer: COMMERCIAL

## 2021-06-22 VITALS — TEMPERATURE: 99 F

## 2021-06-22 DIAGNOSIS — L40.8 SEBOPSORIASIS: Primary | ICD-10-CM

## 2021-06-22 DIAGNOSIS — L30.9 ECZEMA, UNSPECIFIED TYPE: ICD-10-CM

## 2021-06-22 DIAGNOSIS — D18.01 CHERRY ANGIOMA: ICD-10-CM

## 2021-06-22 PROCEDURE — 99214 OFFICE O/P EST MOD 30 MIN: CPT | Performed by: DERMATOLOGY

## 2021-06-22 RX ORDER — KETOCONAZOLE 20 MG/ML
SHAMPOO TOPICAL
Qty: 120 ML | Refills: 2 | Status: SHIPPED | OUTPATIENT
Start: 2021-06-22

## 2021-06-22 RX ORDER — CLOBETASOL PROPIONATE 0.46 MG/ML
SOLUTION TOPICAL
Qty: 50 ML | Refills: 2 | Status: SHIPPED | OUTPATIENT
Start: 2021-06-22

## 2021-06-22 NOTE — PROGRESS NOTES
[facility]) Dermatology  [facility], [address] [place] [address]       Chris Mckinley  1995    27 y.o. female     Date of Visit: 2021    Chief Complaint:   Chief Complaint   Patient presents with    Hair/Scalp Problem     flaking, itchy         I was asked to see this patient by Dr. Shah ref. provider found. History of Present Illness:  Chris Mckinley is a 27 y.o. female who presents with the chief complaint of follow up for the followin. Patient presents today for a new complaint of dry flaky scalp with itching, sometimes greasy scale and sometimes dry scale. Onset about 2 months ago. Does admit to increased stress in her life. Shampoos scalp 3-4 times per week uses OTC color treated shampoo. She has used head and shoulders with honey and menthol, didn't notice a difference but did feel good. Uses occasionally. Denies a rash to her elbows, knees, neck. Denies a personal history or family history of psoriasis. She does have her hair regularly dyed blonde. 2.  Patient reports a chronic several year history of eczema that comes and goes to her chest and shoulders, usually feels dry and she will apply triamcinolone 0.1% ointment for only a few days and will quickly resolve the rash. Does not report a rash today to these areas but needs a refill for flares. 3.  Chronic history of 2 cherry angiomas to her left abdomen, patient continues to find cosmetically unappealing and is now interested in using Vbeam PDL laser for improvement. she denies associated burning, bleeding, pain, or itch. Admits to a history of sun exposure without sunscreen or protective hats/clothing.  Does currently wear sunscreen when outdoors for extended period of time.     Review of Systems:  Constitutional: Reports general sense of well-being   Skin: No new or changing moles, no tendency to develop thick scars, no interval of severe sunburns  Heme: No abnormal bruising or bleeding.       Past Skin Hx:  -12/2018-history of milial cyst to right nasal sidewall status post milia extraction  -History of cherry angiomas-cosmetic Vbeam PDL laser treatment PRN  -pt reports hx of eczema- triamcinolone 0.1% ointment PRN flares  Patient denies past history of melanoma, NMSC, dysplastic nevi     PFHx: Denies hx of MM or NMSC    PMHx: pt denies hx of DM       ADDITIONAL HISTORY:    I have reviewed past medical and surgical histories, current medications, allergies, social and family histories as documented in the patient's electronic medical record. Family History   Problem Relation Age of Onset    Coronary Art Dis Maternal Grandfather     COPD Maternal Grandfather     Parkinsonism Maternal Grandfather     Hypertension Maternal Grandmother     No Known Problems Mother     No Known Problems Father     Breast Cancer Maternal Great Grandmother     Colon Cancer Other         maternal great aunt    Heart Disease Maternal Great Grandmother         arrhythmia     Past Medical History:   Diagnosis Date    Chicken pox     Exercise-induced asthma 1/21/2016    Headache     Mixed anxiety and depressive disorder 10/14/2019    Seasonal allergic rhinitis 1/21/2016     Past Surgical History:   Procedure Laterality Date    CHALAZION EXCISION      LASIK      WISDOM TOOTH EXTRACTION         Allergies   Allergen Reactions    Sulfa Antibiotics Hives     Outpatient Medications Marked as Taking for the 6/22/21 encounter (Office Visit) with Lanie Gibbons, DO   Medication Sig Dispense Refill    ketoconazole (NIZORAL) 2 % shampoo Apply to scalp TIW, lather and leave on for 3 minutes before rinsing off. 120 mL 2    clobetasol (TEMOVATE) 0.05 % external solution Apply to affected areas on scalp nightly for 4 weeks 50 mL 2    triamcinolone (KENALOG) 0.1 % ointment Apply topically to areas of eczema BID for up to 2 weeks, then stop for 2 weeks prn flares.  Avoid face, groin, body folds 30 g 1    escitalopram (LEXAPRO) 20 MG tablet Take 1 tablet by mouth nightly 90 tablet 1    SUMAtriptan (IMITREX) 100 MG tablet 1 tab po prn migraine. May repeat in 1 hour for max dose 200 mg 24 hour period 27 tablet 3    albuterol sulfate HFA (PROVENTIL HFA) 108 (90 Base) MCG/ACT inhaler Inhale 2 puffs into the lungs every 6 hours as needed for Wheezing 1 Inhaler 3       Social History:   Social History     Socioeconomic History    Marital status: Single     Spouse name: Not on file    Number of children: Not on file    Years of education: Not on file    Highest education level: Not on file   Occupational History    Occupation: [employer]   Tobacco Use    Smoking status: Never Smoker    Smokeless tobacco: Never Used   Vaping Use    Vaping Use: Never used   Substance and Sexual Activity    Alcohol use: Yes     Alcohol/week: 5.0 standard drinks     Types: 5 Glasses of wine per week     Comment: social    Drug use: No    Sexual activity: Yes     Partners: Male   Other Topics Concern    Not on file   Social History Narrative    System role digital marketing team        From [employer]    Graduated [employer]        Plays soccer 4 times weekly        dairy         Social Determinants of Health     Financial Resource Strain:     Difficulty of Paying Living Expenses:    Food Insecurity:     Worried About Running Out of Food in the Last Year:     Ran Out of Food in the Last Year:    Transportation Needs:     Lack of Transportation (Medical):      Lack of Transportation (Non-Medical):    Physical Activity:     Days of Exercise per Week:     Minutes of Exercise per Session:    Stress:     Feeling of Stress :    Social Connections:     Frequency of Communication with Friends and Family:     Frequency of Social Gatherings with Friends and Family:     Attends Shinto Services:     Active Member of Clubs or Organizations:     Attends Club or Organization Meetings:     Marital Status:    Intimate Partner Violence:     Fear of Current or Ex-Partner: skin care regimen. 3. Cherry angioma  -Educated patient on benign nature and the potential to develop more cherry angiomas as one ages. -Due to benign nature, no treatment is necessary. Reassurance and observation recommended. -Recommend Vbeam PDL laser treatment, will likely require average 3 monthly treatments then every 6-12 months for maintenance. Discussed risks v. Benefits and side effects such as bruising, burning, blistering, swelling, dyspigmentation, redness, scarring, dyspigmentation, etc.   -$100/tx up to 5 angiomas  -Discussed option(s) for cosmetic tx and associated out of pocket fee. Patient is to call office and schedule cosmetic visit if desired prn. Return to Clinic: 4-6 weeks; PRN 15 min Vbeam July, Aug, Sept   Discussed plan with patient and/or primary caretaker. Patient to call clinic with any questions / concerns. Reviewed proper use and side effects of treatment(s) and/or medication(s) with patient and/or primary caretaker. AVS provided or is available on [facility]     Note is transcribed using voice recognition software. Inadvertent computerized transcription errors may be present.

## 2021-06-22 NOTE — TELEPHONE ENCOUNTER
[facility] pharmacy lmvm to please return call to clarify TIW directions for ketoconazole (NIZORAL) 2 % shampoo. Please advise. Thank you!

## 2021-06-24 ENCOUNTER — OFFICE VISIT (OUTPATIENT)
Dept: INTERNAL MEDICINE CLINIC | Age: 26
End: 2021-06-24
Payer: COMMERCIAL

## 2021-06-24 VITALS
HEART RATE: 72 BPM | WEIGHT: 193 LBS | DIASTOLIC BLOOD PRESSURE: 80 MMHG | BODY MASS INDEX: 34.19 KG/M2 | RESPIRATION RATE: 14 BRPM | SYSTOLIC BLOOD PRESSURE: 112 MMHG | OXYGEN SATURATION: 94 %

## 2021-06-24 DIAGNOSIS — J01.90 ACUTE SINUSITIS WITH SYMPTOMS > 10 DAYS: Primary | ICD-10-CM

## 2021-06-24 DIAGNOSIS — J04.0 ACUTE LARYNGITIS: ICD-10-CM

## 2021-06-24 PROCEDURE — 99214 OFFICE O/P EST MOD 30 MIN: CPT | Performed by: INTERNAL MEDICINE

## 2021-06-24 RX ORDER — AMOXICILLIN AND CLAVULANATE POTASSIUM 875; 125 MG/1; MG/1
1 TABLET, FILM COATED ORAL 2 TIMES DAILY
Qty: 14 TABLET | Refills: 0 | Status: SHIPPED | OUTPATIENT
Start: 2021-06-24 | End: 2021-07-01

## 2021-06-24 ASSESSMENT — ENCOUNTER SYMPTOMS
TROUBLE SWALLOWING: 0
COUGH: 0
WHEEZING: 0
VOICE CHANGE: 1
SORE THROAT: 1
SHORTNESS OF BREATH: 0
EYE DISCHARGE: 1
SINUS PRESSURE: 1
RHINORRHEA: 0
SINUS PAIN: 1

## 2021-06-24 NOTE — PROGRESS NOTES
Lisa eSthi (:  1995) is a 27 y.o. female,Established patient, here for evaluation of the following chief complaint(s):  Follow-up (x1 month returned from vacation and had a head cold, sx went away but last week started with sore throat, loss her voice, hurts to swallow, green/yellow/ blood sputum. some sinus pressure. red leaky eyes, bloody discharge from eyes  )      ASSESSMENT/PLAN:  1. Acute sinusitis with symptoms > 10 days  -     amoxicillin-clavulanate (AUGMENTIN) 875-125 MG per tablet; Take 1 tablet by mouth 2 times daily for 7 days, Disp-14 tablet, R-0Normal  2. Acute laryngitis  -     amoxicillin-clavulanate (AUGMENTIN) 875-125 MG per tablet; Take 1 tablet by mouth 2 times daily for 7 days, Disp-14 tablet, R-0Normal  Likely viral infection transformed to bacterial given her sinus tenderness eye discharge and mucus clearing. Will Rx Augmentin and have her use guaifenesin and Nasacort. Discussed vocal rest for her laryngitis. If persists could consider a course of oral steroids. Return in about 4 months (around 10/24/2021) for Physical.    SUBJECTIVE/OBJECTIVE:  HPI    Went to [facility] second week of may with family. Kids were sick by the end. She got sick on the way home - head cold type symptoms. Lingered for a week. Got better for a while. Last week (about 10 days ago) had some sore throat/throat tickle. Never felt poorly however. Planning a surprise party over the weekend. Party was Saturday and  woke up with no voice and has gotten worse over the course of the week. Has been trying to do some vocal rest.     Has had COVID vaccines. Some sinus pressure. Ear pressure. Having some eye discharge -     Review of Systems   Constitutional: Negative for chills, fatigue and fever. HENT: Positive for congestion, ear pain, postnasal drip, sinus pressure, sinus pain, sore throat and voice change. Negative for ear discharge, rhinorrhea and trouble swallowing.     Eyes: Positive for discharge. Respiratory: Negative for cough, shortness of breath and wheezing. Current Outpatient Medications on File Prior to Visit   Medication Sig Dispense Refill    ketoconazole (NIZORAL) 2 % shampoo Apply to scalp TIW, lather and leave on for 3 minutes before rinsing off. 120 mL 2    clobetasol (TEMOVATE) 0.05 % external solution Apply to affected areas on scalp nightly for 4 weeks 50 mL 2    triamcinolone (KENALOG) 0.1 % ointment Apply topically to areas of eczema BID for up to 2 weeks, then stop for 2 weeks prn flares. Avoid face, groin, body folds 30 g 1    escitalopram (LEXAPRO) 20 MG tablet Take 1 tablet by mouth nightly 90 tablet 1    SUMAtriptan (IMITREX) 100 MG tablet 1 tab po prn migraine. May repeat in 1 hour for max dose 200 mg 24 hour period 27 tablet 3    albuterol sulfate HFA (PROVENTIL HFA) 108 (90 Base) MCG/ACT inhaler Inhale 2 puffs into the lungs every 6 hours as needed for Wheezing 1 Inhaler 3     No current facility-administered medications on file prior to visit. Vitals:    06/24/21 0923   BP: 112/80   Pulse: 72   Resp: 14   SpO2: 94%     Physical Exam  Constitutional:       General: She is not in acute distress. Appearance: Normal appearance. She is not ill-appearing, toxic-appearing or diaphoretic. HENT:      Head: Normocephalic and atraumatic. Right Ear: Tympanic membrane, ear canal and external ear normal.      Left Ear: Tympanic membrane, ear canal and external ear normal.      Nose: Nose normal.      Comments: Maxillary TTP mild. Turbinates erythematous and edematous. Mouth/Throat:      Mouth: Mucous membranes are moist.      Pharynx: Oropharynx is clear. No oropharyngeal exudate or posterior oropharyngeal erythema. Comments: Mild edema  Eyes:      General: No scleral icterus. Conjunctiva/sclera: Conjunctivae normal.      Pupils: Pupils are equal, round, and reactive to light.    Cardiovascular:      Rate and Rhythm: Normal rate and regular rhythm. Pulses: Normal pulses. Heart sounds: Normal heart sounds. No murmur heard. No friction rub. No gallop. Pulmonary:      Effort: Pulmonary effort is normal.      Breath sounds: Normal breath sounds. No wheezing, rhonchi or rales. Abdominal:      General: Abdomen is flat. Bowel sounds are normal.   Musculoskeletal:         General: No deformity. Cervical back: Normal range of motion. Right lower leg: No edema. Left lower leg: No edema. Lymphadenopathy:      Cervical: Cervical adenopathy (shotty anterior cervical) present. Skin:     General: Skin is warm and dry. Findings: No rash. Neurological:      General: No focal deficit present. Mental Status: She is alert. Coordination: Coordination normal.      Gait: Gait normal.   Psychiatric:         Mood and Affect: Mood normal.         Behavior: Behavior normal.           On this date 6/24/2021 I have spent 30 minutes reviewing previous notes, test results and face to face with the patient discussing the diagnosis and importance of compliance with the treatment plan as well as documenting on the day of the visit. An electronic signature was used to authenticate this note.     --Frederick Lamas MD

## 2021-07-08 ENCOUNTER — TELEPHONE (OUTPATIENT)
Dept: INTERNAL MEDICINE CLINIC | Age: 26
End: 2021-07-08

## 2021-07-08 NOTE — TELEPHONE ENCOUNTER
Per dr huynh, billing code should have been [phone]. Requested I inform ETIENNE Castillo, for further assistance.

## 2021-07-15 NOTE — PATIENT INSTRUCTIONS
Sun Protection Tips    Apply broad spectrum water resistant sunscreen with an SPF of at least 30 to exposed areas of the skin. Dont forget the ears and lips! Remember to reapply sunscreen about every 2 hours and after swimming or sweating. Wear sun protective clothing. Swim shirts (aka. rash guards) are a great idea and negates the need to reapply sunscreen in those areas. Seek the shade whenever possible especially between the hours of 10am and 4pm when the suns rays are the strongest.     Avoid tanning beds          Wear a wide brim hat while in the sun        Following the procedure, the treated areas may be red or appear bruised and will likely be swollen for a few hours or up to a few days. If brown spots were treated today, expect that they will darken first for about one week before shedding off. The affected areas should be treated delicately following treatment. Discomfort and swelling should be treated with the application of hourly cool compresses the evening of the procedure. Avoid applying ice directly to the skin. If your face was treated, you may want to sleep with your head elevated on an extra pillow to help minimize swelling. Use Aquaphor daily as needed to treated areas. Multiple consecutive treatments may be needed to achieve desired outcome or significant improvement. Wear sunscreen daily. Avoid extra aspirin, ibuprofen, vitamin E following the procedure - this may increase your chance of bruising. Improper care of the treated area while the discoloration is present may increase the chance of scarring, hypo- or hyper-pigmentation, or skin textural changes to the treated area. Please call the office if you have excessive discomfort, herpes simplex virus flare, or burns, blisters, or scabbing. Thanks for your visit! Feel free to call/[employer] message  Dr. Edward Lesches assistantZahra if you have questions, concerns or to schedule your cosmetic procedures.

## 2021-07-16 ENCOUNTER — PROCEDURE VISIT (OUTPATIENT)
Dept: DERMATOLOGY | Age: 26
End: 2021-07-16

## 2021-07-16 VITALS — TEMPERATURE: 98.4 F

## 2021-07-16 DIAGNOSIS — Z41.1 ELECTIVE PROCEDURE FOR UNACCEPTABLE COSMETIC APPEARANCE: ICD-10-CM

## 2021-07-16 DIAGNOSIS — D18.01 CHERRY ANGIOMA: Primary | ICD-10-CM

## 2021-07-16 PROCEDURE — DM01335 VBEAM LASER ANGIOMAS 1-5: Performed by: DERMATOLOGY

## 2021-07-16 NOTE — PROGRESS NOTES
[facility]) Dermatology  [place], [place], [address]       Narayan Dial  1995    27 y.o. female     Date of Visit: 7/16/2021    Chief Complaint:   Chief Complaint   Patient presents with    Laser Treatment     cherry angioma left abdomen        I was asked to see this patient by Dr. Shah ref. provider found. History of Present Illness:  Narayan Benton is a 27 y.o. female who presents with the chief complaint of cherry angiomas to left abdomen, R superior back    Presents today for treatment #1 with Vbeam PDL. Questions answered in detail. Risks v. Benefits discussed. Patient aware may take multiple monthly treatments to attain significant improvement.  -Denies history of cold sores/herpes labialis      PATIENT IDENTIFIED PER PROTOCOL: yes  LOCATION(S): left abdomen, R superior back  VERIFIED AND MARKED: yes  TECHNIQUES, RISKS, BENEFITS AND ALTERNATIVES EXPLAINED: yes  CONSENT SIGNED, WITNESSED AND DATED: yes      RISKS: Pain with treatment, swelling, pigmentary changes, scarring, blisters/crusting, non-resolution, recurrence, unwanted cosmetic outcome, the need for multiple treatments. We discussed treatment options, including no treatment as well as the following:  - need for multiple treatments and risk of incomplete clearance, recurrence  - risk of erythema, edema, purpura, dyspigmentation and scarring    In addition, the importance of sun avoidance/protection and avoiding manipulation to the areas were emphasized with the patient. Review of Systems:  Constitutional: Reports general sense of well-being   Skin: No new or changing moles, no history of keloids or hypertrophic scars. Heme: No abnormal bruising or bleeding.     Past Medical History, Family History, Surgical History, Medications and Allergies reviewed      Family History   Problem Relation Age of Onset    Coronary Art Dis Maternal Grandfather     COPD Maternal Grandfather     Parkinsonism Maternal Grandfather     Hypertension Maternal Grandmother     No Known Problems Mother     No Known Problems Father     Breast Cancer Maternal Great Grandmother     Colon Cancer Other         maternal great aunt    Heart Disease Maternal Great Grandmother         arrhythmia     Past Medical History:   Diagnosis Date    Chicken pox     Exercise-induced asthma 1/21/2016    Headache     Mixed anxiety and depressive disorder 10/14/2019    Seasonal allergic rhinitis 1/21/2016     Past Surgical History:   Procedure Laterality Date    CHALAZION EXCISION      LASIK      WISDOM TOOTH EXTRACTION         Allergies   Allergen Reactions    Sulfa Antibiotics Hives     Outpatient Medications Marked as Taking for the 7/16/21 encounter (Procedure visit) with Aisha Alcaraz, DO   Medication Sig Dispense Refill    ketoconazole (NIZORAL) 2 % shampoo Apply to scalp TIW, lather and leave on for 3 minutes before rinsing off. 120 mL 2    clobetasol (TEMOVATE) 0.05 % external solution Apply to affected areas on scalp nightly for 4 weeks 50 mL 2    triamcinolone (KENALOG) 0.1 % ointment Apply topically to areas of eczema BID for up to 2 weeks, then stop for 2 weeks prn flares. Avoid face, groin, body folds 30 g 1    escitalopram (LEXAPRO) 20 MG tablet Take 1 tablet by mouth nightly 90 tablet 1    SUMAtriptan (IMITREX) 100 MG tablet 1 tab po prn migraine.  May repeat in 1 hour for max dose 200 mg 24 hour period 27 tablet 3    albuterol sulfate HFA (PROVENTIL HFA) 108 (90 Base) MCG/ACT inhaler Inhale 2 puffs into the lungs every 6 hours as needed for Wheezing 1 Inhaler 3       Social History:   Social History     Socioeconomic History    Marital status: Single     Spouse name: Not on file    Number of children: Not on file    Years of education: Not on file    Highest education level: Not on file   Occupational History    Occupation: [employer]   Tobacco Use    Smoking status: Never Smoker    Smokeless tobacco: Never Used   Vaping Use    Vaping Use: Never used   Substance and Sexual Activity    Alcohol use: Yes     Alcohol/week: 5.0 standard drinks     Types: 5 Glasses of wine per week     Comment: social    Drug use: No    Sexual activity: Yes     Partners: Male   Other Topics Concern    Not on file   Social History Narrative    System role digital marketing team        From [employer]    Graduated [employer]        Plays soccer 4 times weekly        dairy         Social Determinants of Health     Financial Resource Strain:     Difficulty of Paying Living Expenses:    Food Insecurity:     Worried About Running Out of Food in the Last Year:     Ran Out of Food in the Last Year:    Transportation Needs:     Lack of Transportation (Medical):  Lack of Transportation (Non-Medical):    Physical Activity:     Days of Exercise per Week:     Minutes of Exercise per Session:    Stress:     Feeling of Stress :    Social Connections:     Frequency of Communication with Friends and Family:     Frequency of Social Gatherings with Friends and Family:     Attends [employer] witness Services:     Active Member of Clubs or Organizations:     Attends Club or Organization Meetings:     Marital Status:    Intimate Partner Violence:     Fear of Current or Ex-Partner:     Emotionally Abused:     Physically Abused:     Sexually Abused:        Physical Examination     Well appearing, A&Ox3, NAD  Left abdomen(2), R superior back(2)- 4 Bright red well circumscribed papule(s)    Pictures prior to treatment #1                1. TREATMENT # 1  AREA TO BE TREATED:left abdomen, R superior back  DIAGNOSIS, LOCATION, PROCEDURE RECONFIRMED: yes  EYE PROTECTION: yes  ANESTHESIA/PRE-OP MEDICATIONS: none  LASER SETTINGS:  (1)  WAVELENGTH: 595 LENS: 5mm spot size FLUENCE: 10 J/cm2 PULSE DURATION: 10m/s COOLINms spray/20 delay:  Single stack and focal double stack  (+) focal transient purpura  Assessment and Plan     1. Cherry angioma    2.  Elective procedure for

## 2021-07-20 NOTE — TELEPHONE ENCOUNTER
Per Hussein Neal via media reply, \"  I have sent to the billing department for correction. Please notify the patient that this has been done and it will show on their account in the next 1-2 cycles depending on the timing of the correction. Thank you. Hussein Neal   Patient informed via "[employer].

## 2021-07-28 ENCOUNTER — E-VISIT (OUTPATIENT)
Dept: INTERNAL MEDICINE CLINIC | Age: 26
End: 2021-07-28
Payer: COMMERCIAL

## 2021-07-28 DIAGNOSIS — L23.3 ALLERGIC CONTACT DERMATITIS DUE TO DRUGS IN CONTACT WITH SKIN: ICD-10-CM

## 2021-07-28 DIAGNOSIS — S80.212A INFECTED ABRASION OF LEFT KNEE, INITIAL ENCOUNTER: Primary | ICD-10-CM

## 2021-07-28 DIAGNOSIS — L08.9 INFECTED ABRASION OF LEFT KNEE, INITIAL ENCOUNTER: Primary | ICD-10-CM

## 2021-07-28 DIAGNOSIS — S80.212A INFECTED ABRASION OF LEFT KNEE, INITIAL ENCOUNTER: ICD-10-CM

## 2021-07-28 DIAGNOSIS — L08.9 INFECTED ABRASION OF LEFT KNEE, INITIAL ENCOUNTER: ICD-10-CM

## 2021-07-28 PROCEDURE — 99421 OL DIG E/M SVC 5-10 MIN: CPT | Performed by: INTERNAL MEDICINE

## 2021-07-28 RX ORDER — CEPHALEXIN 500 MG/1
500 CAPSULE ORAL 3 TIMES DAILY
Qty: 15 CAPSULE | Refills: 0 | Status: SHIPPED | OUTPATIENT
Start: 2021-07-28 | End: 2021-07-28 | Stop reason: SDUPTHER

## 2021-07-28 RX ORDER — FLUOCINONIDE 0.5 MG/G
OINTMENT TOPICAL
Qty: 30 G | Refills: 0 | Status: SHIPPED | OUTPATIENT
Start: 2021-07-28 | End: 2021-07-28 | Stop reason: SDUPTHER

## 2021-07-28 RX ORDER — CEPHALEXIN 500 MG/1
500 CAPSULE ORAL 3 TIMES DAILY
Qty: 15 CAPSULE | Refills: 0 | Status: SHIPPED | OUTPATIENT
Start: 2021-07-28 | End: 2021-08-02

## 2021-07-28 RX ORDER — FLUOCINONIDE 0.5 MG/G
OINTMENT TOPICAL
Qty: 30 G | Refills: 0 | Status: SHIPPED | OUTPATIENT
Start: 2021-07-28 | End: 2021-08-04

## 2021-08-15 DIAGNOSIS — F32.1 CURRENT MODERATE EPISODE OF MAJOR DEPRESSIVE DISORDER WITHOUT PRIOR EPISODE (HCC): ICD-10-CM

## 2021-08-16 RX ORDER — ESCITALOPRAM OXALATE 20 MG/1
20 TABLET ORAL NIGHTLY
Qty: 90 TABLET | Refills: 1 | Status: SHIPPED | OUTPATIENT
Start: 2021-08-16 | End: 2022-02-14 | Stop reason: SDUPTHER

## 2021-08-19 NOTE — PATIENT INSTRUCTIONS
Sun Protection Tips    Apply broad spectrum water resistant sunscreen with an SPF of at least 30 to exposed areas of the skin. Dont forget the ears and lips! Remember to reapply sunscreen about every 2 hours and after swimming or sweating. Wear sun protective clothing. Swim shirts (aka. rash guards) are a great idea and negates the need to reapply sunscreen in those areas. Seek the shade whenever possible especially between the hours of 10am and 4pm when the suns rays are the strongest.     Avoid tanning beds          Wear a wide brim hat while in the sun        Thanks for your visit! Feel free to call/[employer] message  Dr. Ivonne Merrill assistantZahra if you have questions, concerns or to schedule your cosmetic procedures.

## 2021-08-20 ENCOUNTER — PROCEDURE VISIT (OUTPATIENT)
Dept: DERMATOLOGY | Age: 26
End: 2021-08-20

## 2021-08-20 VITALS — TEMPERATURE: 98.4 F

## 2021-08-20 DIAGNOSIS — D18.01 CHERRY ANGIOMA: Primary | ICD-10-CM

## 2021-08-20 DIAGNOSIS — Z41.1 ELECTIVE PROCEDURE FOR UNACCEPTABLE COSMETIC APPEARANCE: ICD-10-CM

## 2021-08-20 PROCEDURE — DM01335 VBEAM LASER ANGIOMAS 1-5: Performed by: DERMATOLOGY

## 2021-08-20 NOTE — PROGRESS NOTES
[address]  Dermatology  [place], [place], [address]       Mikel Kenney  1995    27 y.o. female     Date of Visit: 8/20/2021    Chief Complaint:   Chief Complaint   Patient presents with    Laser Treatment     vbeam on cherry angiomas on torso        I was asked to see this patient by Dr. Shah ref. provider found. History of Present Illness:  Mikel Kenney is a 27 y.o. female who presents with the chief complaint of cherry angiomas to left abdomen, R superior back. She has noticed lightening of angiomas to abdomen, difficult for her to visualize her back. Does have 2 other angiomas on abdomen untreated last visit. Presents today for treatment #2 with Vbeam PDL. Questions answered in detail. Risks v. Benefits discussed. Patient aware may take multiple monthly treatments to attain significant improvement.  -Denies history of cold sores/herpes labialis      PATIENT IDENTIFIED PER PROTOCOL: yes  LOCATION(S): left abdomen  VERIFIED AND MARKED: yes  TECHNIQUES, RISKS, BENEFITS AND ALTERNATIVES EXPLAINED: yes  CONSENT SIGNED, WITNESSED AND DATED: yes      RISKS: Pain with treatment, swelling, pigmentary changes, scarring, blisters/crusting, non-resolution, recurrence, unwanted cosmetic outcome, the need for multiple treatments. We discussed treatment options, including no treatment as well as the following:  - need for multiple treatments and risk of incomplete clearance, recurrence  - risk of erythema, edema, purpura, dyspigmentation and scarring    In addition, the importance of sun avoidance/protection and avoiding manipulation to the areas were emphasized with the patient. Review of Systems:  Constitutional: Reports general sense of well-being   Skin: No new or changing moles, no history of keloids or hypertrophic scars. Heme: No abnormal bruising or bleeding.     Past Medical History, Family History, Surgical History, Medications and Allergies reviewed      Family History file   Occupational History    Occupation: [employer]   Tobacco Use    Smoking status: Never Smoker    Smokeless tobacco: Never Used   Vaping Use    Vaping Use: Never used   Substance and Sexual Activity    Alcohol use: Yes     Alcohol/week: 5.0 standard drinks     Types: 5 Glasses of wine per week     Comment: social    Drug use: No    Sexual activity: Yes     Partners: Male   Other Topics Concern    Not on file   Social History Narrative    System role digital marketing team        [employer]    Graduated [employer]        Plays soccer 4 times weekly        dairy         Social Determinants of Health     Financial Resource Strain:     Difficulty of Paying Living Expenses:    Food Insecurity:     Worried About Running Out of Food in the Last Year:     Ran Out of Food in the Last Year:    Transportation Needs:     Lack of Transportation (Medical):  Lack of Transportation (Non-Medical):    Physical Activity:     Days of Exercise per Week:     Minutes of Exercise per Session:    Stress:     Feeling of Stress :    Social Connections:     Frequency of Communication with Friends and Family:     Frequency of Social Gatherings with Friends and Family:     Attends [employer] Services:     Active Member of Clubs or Organizations:     Attends Club or Organization Meetings:     Marital Status:    Intimate Partner Violence:     Fear of Current or Ex-Partner:     Emotionally Abused:     Physically Abused:     Sexually Abused:        Physical Examination     Well appearing, A&Ox3, NAD  Left abdomen(2-treated last visit)- decreased redness noted. Left abdomen- 2 different angiomas- Bright red well circumscribed papule(s), R superior back- 2 treated last visit-redness resolved.      Pictures prior to treatment #2            1. TREATMENT # 2  AREA TO BE TREATED:left abdomen (2) from last visit, 2 different angiomas (treatment #1)  DIAGNOSIS, LOCATION, PROCEDURE RECONFIRMED: yes  EYE PROTECTION: yes  ANESTHESIA/PRE-OP MEDICATIONS: none  LASER SETTINGS:  (1)  WAVELENGTH: 595 LENS: 5mm spot size FLUENCE: 10 J/cm2 PULSE DURATION: 10m/s COOLINms spray/20 delay:  Single stack and focal double stack  (+) focal transient purpura  Assessment and Plan     1. Cherry angioma    2. Elective procedure for unacceptable cosmetic appearance        1. Cherry angioma  Vbeam PDL laser treatment as outlined above. Patient educated to wear sunscreen of at least SPF 30 daily to face, neck, chest and other sun exposed areas    POST-OPERATIVE CARE/DISPOSITION: aquaphor and ice,  patient tolerated procedure well, left in stable condition  COMPLICATIONS: none  MEDICATIONS: none  WOUND CARE INSTRUCTIONS PROVIDED: yes, patient cooled treated areas with ice pack for 5 minutes before leaving, Instructed to ice the area for 5 minutes every hour for 5 hours at home    Discussed with patient that she will have redness, discoloration, swelling, and likely bruising and may take 2-3 weeks to resolve. She is to call the office if she experiences any adverse side effects that are concerning to her.    $100    2. Elective procedure for unacceptable cosmetic appearance  See above          Return to Clinic: 3-4 weeks vbeam PRN  Discussed plan with patient and/or primary caretaker. Patient to call clinic with any questions / concerns. Reviewed proper use and side effects of treatment(s) and/or medication(s) with patient and/or primary caretaker. AVS provided or is available on [employer]     Note is transcribed using voice recognition software. Inadvertent computerized transcription errors may be present. Return for as scheduled.

## 2021-09-15 LAB
CHOLESTEROL, TOTAL: 188 MG/DL (ref 0–199)
GLUCOSE BLD-MCNC: 80 MG/DL (ref 70–99)
HDLC SERPL-MCNC: 41 MG/DL (ref 40–60)
LDL CHOLESTEROL CALCULATED: 118 MG/DL
TRIGL SERPL-MCNC: 147 MG/DL (ref 0–150)

## 2021-12-07 ENCOUNTER — OFFICE VISIT (OUTPATIENT)
Dept: INTERNAL MEDICINE CLINIC | Age: 26
End: 2021-12-07
Payer: COMMERCIAL

## 2021-12-07 VITALS
WEIGHT: 196.8 LBS | RESPIRATION RATE: 14 BRPM | HEIGHT: 63 IN | DIASTOLIC BLOOD PRESSURE: 80 MMHG | HEART RATE: 80 BPM | SYSTOLIC BLOOD PRESSURE: 112 MMHG | BODY MASS INDEX: 34.87 KG/M2 | OXYGEN SATURATION: 99 %

## 2021-12-07 DIAGNOSIS — Z23 NEED FOR VACCINATION: ICD-10-CM

## 2021-12-07 DIAGNOSIS — Z00.00 ANNUAL PHYSICAL EXAM: Primary | ICD-10-CM

## 2021-12-07 PROCEDURE — 90471 IMMUNIZATION ADMIN: CPT | Performed by: INTERNAL MEDICINE

## 2021-12-07 PROCEDURE — 90674 CCIIV4 VAC NO PRSV 0.5 ML IM: CPT | Performed by: INTERNAL MEDICINE

## 2021-12-07 PROCEDURE — 99395 PREV VISIT EST AGE 18-39: CPT | Performed by: INTERNAL MEDICINE

## 2021-12-07 ASSESSMENT — ENCOUNTER SYMPTOMS
DIARRHEA: 0
CONSTIPATION: 0
NAUSEA: 0
PHOTOPHOBIA: 0
VOMITING: 0
SINUS PRESSURE: 0
TROUBLE SWALLOWING: 0
EYE PAIN: 0
COUGH: 0
SHORTNESS OF BREATH: 0
COLOR CHANGE: 0
ABDOMINAL PAIN: 0
BACK PAIN: 0
SINUS PAIN: 0
WHEEZING: 0

## 2021-12-07 NOTE — PROGRESS NOTES
History and Physical      Damian Mercado  YOB: 1995    Date of Service:  12/7/2021    Vitals:    12/07/21 1454   BP: 112/80   Pulse: 80   Resp: 14   SpO2: 99%   Weight: 196 lb 12.8 oz (89.3 kg)   Height: 5' 3\" (1.6 m)     Body mass index is 34.86 kg/m². 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 female who presents for complete physical examination. HPI:     Just bought a new house  Mental health has been okay - very stressful times at work. Happy with the lexapro. Patient Active Problem List   Diagnosis    Seasonal allergic rhinitis    Exercise-induced asthma    Eczema    Chronic migraine    Mixed anxiety and depressive disorder       Allergies   Allergen Reactions    Sulfa Antibiotics Hives     Outpatient Medications Marked as Taking for the 12/7/21 encounter (Office Visit) with Pam Sawant MD   Medication Sig Dispense Refill    escitalopram (LEXAPRO) 20 MG tablet Take 1 tablet by mouth nightly 90 tablet 1    ketoconazole (NIZORAL) 2 % shampoo Apply to scalp TIW, lather and leave on for 3 minutes before rinsing off. 120 mL 2    triamcinolone (KENALOG) 0.1 % ointment Apply topically to areas of eczema BID for up to 2 weeks, then stop for 2 weeks prn flares. Avoid face, groin, body folds 30 g 1    SUMAtriptan (IMITREX) 100 MG tablet 1 tab po prn migraine.  May repeat in 1 hour for max dose 200 mg 24 hour period 27 tablet 3    albuterol sulfate HFA (PROVENTIL HFA) 108 (90 Base) MCG/ACT inhaler Inhale 2 puffs into the lungs every 6 hours as needed for Wheezing 1 Inhaler 3       Past Medical History:   Diagnosis Date    Chicken pox     Exercise-induced asthma 1/21/2016    Headache     Mixed anxiety and depressive disorder 10/14/2019    Seasonal allergic rhinitis 1/21/2016     Past Surgical History:   Procedure Laterality Date    CHALAZION EXCISION      LASIK      WISDOM TOOTH EXTRACTION       Family History   Problem Relation Age of Onset    Coronary Art Dis Maternal Grandfather     COPD Maternal Grandfather     Parkinsonism Maternal Grandfather     Hypertension Maternal Grandmother     No Known Problems Mother     No Known Problems Father     Breast Cancer Maternal Great Grandmother     Colon Cancer Other         maternal great aunt    Heart Disease Maternal Great Grandmother         arrhythmia       Review of Systems:  Review of Systems   Constitutional: Negative for appetite change, chills, fatigue, fever and unexpected weight change. No night sweats   HENT: Negative for congestion, ear pain, hearing loss, nosebleeds, sinus pressure, sinus pain, sneezing, tinnitus and trouble swallowing. Eyes: Negative for photophobia, pain and visual disturbance. Respiratory: Negative for cough, shortness of breath and wheezing. Cardiovascular: Negative for chest pain, palpitations and leg swelling. Gastrointestinal: Negative for abdominal pain, constipation, diarrhea, nausea and vomiting. Endocrine: Negative for cold intolerance, heat intolerance, polydipsia, polyphagia and polyuria. Genitourinary: Negative for difficulty urinating, dysuria, frequency, hematuria and urgency. Musculoskeletal: Negative for arthralgias, back pain, joint swelling, myalgias and neck pain. Skin: Negative for color change and rash. Allergic/Immunologic: Negative for environmental allergies, food allergies and immunocompromised state. Neurological: Negative for dizziness, syncope, speech difficulty, weakness, light-headedness, numbness and headaches. Hematological: Negative for adenopathy. Does not bruise/bleed easily. Psychiatric/Behavioral: Negative for dysphoric mood and sleep disturbance. The patient is not nervous/anxious.         Physical Exam  Constitutional:       General: She is not in acute distress. Appearance: Normal appearance. She is well-developed. She is not diaphoretic. HENT:      Head: Normocephalic and atraumatic. Right Ear: Tympanic membrane, ear canal and external ear normal.      Left Ear: Tympanic membrane, ear canal and external ear normal.      Nose: Nose normal.      Mouth/Throat:      Mouth: Mucous membranes are moist.      Pharynx: Oropharynx is clear. No oropharyngeal exudate or posterior oropharyngeal erythema. Eyes:      General: No scleral icterus. Conjunctiva/sclera: Conjunctivae normal.      Pupils: Pupils are equal, round, and reactive to light. Neck:      Thyroid: No thyromegaly. Vascular: No carotid bruit or JVD. Comments: No JVD at 90 deg  No thyromegaly  Cardiovascular:      Rate and Rhythm: Normal rate and regular rhythm. Pulses: Normal pulses. Heart sounds: Normal heart sounds. No murmur heard. No friction rub. No gallop. Pulmonary:      Effort: Pulmonary effort is normal.      Breath sounds: Normal breath sounds. No wheezing or rales. Abdominal:      General: Bowel sounds are normal. There is no distension. Palpations: Abdomen is soft. There is no mass. Tenderness: There is no abdominal tenderness. There is no guarding or rebound. Musculoskeletal:         General: No tenderness or deformity. Normal range of motion. Cervical back: Normal range of motion and neck supple. Right lower leg: No edema. Left lower leg: No edema. Lymphadenopathy:      Cervical: No cervical adenopathy. Skin:     General: Skin is warm and dry. Findings: No lesion or rash. Neurological:      General: No focal deficit present. Mental Status: She is alert and oriented to person, place, and time. Cranial Nerves: No cranial nerve deficit (grossly intact). Sensory: No sensory deficit. Motor: No weakness.       Coordination: Coordination normal. Gait: Gait normal.   Psychiatric:         Mood and Affect: Mood normal.         Behavior: Behavior normal.         Lipid panel:   Lab Results   Component Value Date    CHOL 188 09/15/2021    TRIG 147 09/15/2021    HDL 41 09/15/2021    LDLCALC 118 (H) 09/15/2021     Glucose:   Glucose (mg/dL)   Date Value   09/15/2021 80       No results found for this visit on 12/07/21. Preventive Care:  Hx abnormal PAP: no  Self-breast exams: yes  Eye exam within the past 2 years: yes  Dental exam every 6 months: yes  Exercise: gym or soccer 4-5 times per week  Social History     Socioeconomic History    Marital status: Single     Spouse name: None    Number of children: None    Years of education: None    Highest education level: None   Occupational History    Occupation: [employer]   Tobacco Use    Smoking status: Never Smoker    Smokeless tobacco: Never Used   Vaping Use    Vaping Use: Never used   Substance and Sexual Activity    Alcohol use: Yes     Alcohol/week: 5.0 standard drinks     Types: 5 Glasses of wine per week     Comment: social    Drug use: No    Sexual activity: Yes     Partners: Male   Other Topics Concern    None   Social History Narrative    System role digital marketing team        From [employer]    Graduated [employer]        Plays soccer 4 times weekly        dairy         Social Determinants of Health     Financial Resource Strain:     Difficulty of Paying Living Expenses: Not on file   Food Insecurity:     Worried About Running Out of Food in the Last Year: Not on file    Tommy of Food in the Last Year: Not on file   Transportation Needs:     Lack of Transportation (Medical): Not on file    Lack of Transportation (Non-Medical):  Not on file   Physical Activity:     Days of Exercise per Week: Not on file    Minutes of Exercise per Session: Not on file   Stress:     Feeling of Stress : Not on file   Social Connections:     Frequency of Communication with Friends and Family: Not on file    Frequency of Social Gatherings with Friends and Family: Not on file    Attends Alevism Services: Not on file    Active Member of Clubs or Organizations: Not on file    Attends Club or Organization Meetings: Not on file    Marital Status: Not on file   Intimate Partner Violence:     Fear of Current or Ex-Partner: Not on file    Emotionally Abused: Not on file    Physically Abused: Not on file    Sexually Abused: Not on file   Housing Stability:     Unable to Pay for Housing in the Last Year: Not on file    Number of [place] in the Last Year: Not on file    Unstable Housing in the Last Year: Not on file     Social History     Substance and Sexual Activity   Sexual Activity Yes    Partners: Male       Advance Directive: N, <no information>    Immunization History   Administered Date(s) Administered    COVID-19, [place], Primary or Immunocompromised, PF, 100mcg/0.5mL 01/07/2021, 02/04/2021    HIB PRP-T (ActHIB, Hiberix) 1993, 1993, 1993, 09/23/1994    HPV 9-valent Marylen Harden) 10/14/2019, 12/12/2019, 06/15/2020    Hepatitis B 1993, 1993, 03/11/1994    Hepatitis B (Engerix-B) 1993, 1993, 03/11/1994    Influenza, Quadv, IM, PF (6 mo and older Fluzone, Flulaval, Fluarix, and 3 yrs and older Afluria) 10/10/2017, 10/08/2018, 10/14/2019, 10/15/2020    MMR 09/23/1994, 08/24/1999    Meningococcal MCV4P (Menactra) 07/03/2008    PPD Test 06/06/1994, 05/26/1998    Pneumococcal Polysaccharide ([id number]) 04/09/2019    Polio OPV 1993, 1993, 12/23/1994, 05/26/1998    Rabies 10/10/2018, 10/15/2018, 10/18/2018, 10/25/2018    Tdap (Boostrix, Adacel) 1993, 1993, 1993, 12/23/1994, 05/26/1998, 07/03/2008, 08/11/2011, 10/10/2017       Health Maintenance   Topic Date Due    Hepatitis C screen  Never done    Varicella vaccine (1 of 2 - 2-dose childhood series) Never done    COVID-19 Vaccine (3 - Booster for [place] series) 08/04/2021  Flu vaccine (1) 09/01/2021    Pap smear  12/07/2023    DTaP/Tdap/Td vaccine (4 - Td or Tdap) 10/10/2027    Pneumococcal 0-64 years Vaccine (2 of 2 - PPSV23) 05/21/2058    Hepatitis B vaccine  Completed    Hib vaccine  Completed    HIV screen  Completed    Hepatitis A vaccine  Aged Out    Meningococcal (ACWY) vaccine  Aged Out        Assessment/Plan:  1. Annual physical exam  Care gaps identified and addressed  Discussed need for hep c screening in future  Flu shot today  Recommend COVID booster  UTD on gyn exam  Safe sex practices  Sun protection  Healthy diet and exercise  Mental health discussed    2. Need for vaccination  -     INFLUENZA, MDCK QUADV, 2 YRS AND OLDER, IM, PF, PREFILL SYR OR SDV, 0.5ML (FLUCELVAX QUADV, PF)         Return in about 6 months (around 6/7/2022) for chronic conditions.

## 2022-02-14 DIAGNOSIS — F32.1 CURRENT MODERATE EPISODE OF MAJOR DEPRESSIVE DISORDER WITHOUT PRIOR EPISODE (HCC): ICD-10-CM

## 2022-02-15 RX ORDER — ESCITALOPRAM OXALATE 20 MG/1
20 TABLET ORAL NIGHTLY
Qty: 90 TABLET | Refills: 1 | Status: SHIPPED | OUTPATIENT
Start: 2022-02-15 | End: 2022-08-23

## 2022-02-15 NOTE — TELEPHONE ENCOUNTER
Last appointment: 12/7/2021  Next appointment: Visit date not found  Last refill: 8/16/2021  Appointment not due for >6 months.

## 2022-08-22 DIAGNOSIS — F32.1 CURRENT MODERATE EPISODE OF MAJOR DEPRESSIVE DISORDER WITHOUT PRIOR EPISODE (HCC): ICD-10-CM

## 2022-08-23 ENCOUNTER — HOSPITAL ENCOUNTER (OUTPATIENT)
Dept: ULTRASOUND IMAGING | Age: 27
Discharge: HOME | End: 2022-08-23
Payer: COMMERCIAL

## 2022-08-23 DIAGNOSIS — N64.4 MASTODYNIA: ICD-10-CM

## 2022-08-23 PROCEDURE — 76642 ULTRASOUND BREAST LIMITED: CPT

## 2022-08-23 RX ORDER — ESCITALOPRAM OXALATE 20 MG/1
TABLET ORAL
Qty: 90 TABLET | Refills: 1 | Status: SHIPPED | OUTPATIENT
Start: 2022-08-23

## 2022-08-25 DIAGNOSIS — F32.1 CURRENT MODERATE EPISODE OF MAJOR DEPRESSIVE DISORDER WITHOUT PRIOR EPISODE (HCC): ICD-10-CM

## 2022-08-26 ENCOUNTER — HOSPITAL ENCOUNTER (OUTPATIENT)
Dept: ULTRASOUND IMAGING | Age: 27
Discharge: HOME | End: 2022-08-26
Payer: COMMERCIAL

## 2022-08-26 DIAGNOSIS — R93.89 ABNORMAL ULTRASOUND: ICD-10-CM

## 2022-08-26 PROCEDURE — 19084 BX BREAST ADD LESION US IMAG: CPT

## 2022-08-26 PROCEDURE — 88305 TISSUE EXAM BY PATHOLOGIST: CPT

## 2022-08-26 PROCEDURE — 2709999900 US BREAST BIOPSY W LOC DEVICE 1ST LESION RIGHT

## 2022-08-26 RX ORDER — ESCITALOPRAM OXALATE 20 MG/1
TABLET ORAL
Qty: 90 TABLET | Refills: 1 | OUTPATIENT
Start: 2022-08-26

## 2022-08-26 NOTE — TELEPHONE ENCOUNTER
Last appointment: 12/7/2021  Next appointment: 8/30/2022  Last refill: 8/23/2022      Refilled on 8/23/2022 with a 90 day supply and 1 additional refill

## 2022-08-30 ENCOUNTER — OFFICE VISIT (OUTPATIENT)
Dept: INTERNAL MEDICINE CLINIC | Age: 27
End: 2022-08-30
Payer: COMMERCIAL

## 2022-08-30 VITALS
HEART RATE: 85 BPM | SYSTOLIC BLOOD PRESSURE: 118 MMHG | DIASTOLIC BLOOD PRESSURE: 78 MMHG | BODY MASS INDEX: 34.9 KG/M2 | WEIGHT: 197 LBS | RESPIRATION RATE: 14 BRPM | OXYGEN SATURATION: 99 %

## 2022-08-30 DIAGNOSIS — Z23 NEED FOR VACCINATION: ICD-10-CM

## 2022-08-30 DIAGNOSIS — Z11.59 ENCOUNTER FOR HEPATITIS C SCREENING TEST FOR LOW RISK PATIENT: ICD-10-CM

## 2022-08-30 DIAGNOSIS — F41.8 MIXED ANXIETY AND DEPRESSIVE DISORDER: ICD-10-CM

## 2022-08-30 DIAGNOSIS — Z00.00 ENCOUNTER FOR WELL ADULT EXAM WITHOUT ABNORMAL FINDINGS: Primary | ICD-10-CM

## 2022-08-30 DIAGNOSIS — Z13.1 SCREENING FOR DIABETES MELLITUS (DM): ICD-10-CM

## 2022-08-30 DIAGNOSIS — J45.990 EXERCISE-INDUCED ASTHMA: ICD-10-CM

## 2022-08-30 DIAGNOSIS — Z13.220 ENCOUNTER FOR LIPID SCREENING FOR CARDIOVASCULAR DISEASE: ICD-10-CM

## 2022-08-30 DIAGNOSIS — Z13.6 ENCOUNTER FOR LIPID SCREENING FOR CARDIOVASCULAR DISEASE: ICD-10-CM

## 2022-08-30 PROCEDURE — 99395 PREV VISIT EST AGE 18-39: CPT | Performed by: INTERNAL MEDICINE

## 2022-08-30 PROCEDURE — 90471 IMMUNIZATION ADMIN: CPT | Performed by: INTERNAL MEDICINE

## 2022-08-30 PROCEDURE — 90677 PCV20 VACCINE IM: CPT | Performed by: INTERNAL MEDICINE

## 2022-08-30 ASSESSMENT — PATIENT HEALTH QUESTIONNAIRE - PHQ9
1. LITTLE INTEREST OR PLEASURE IN DOING THINGS: 0
2. FEELING DOWN, DEPRESSED OR HOPELESS: 0
3. TROUBLE FALLING OR STAYING ASLEEP: 2
SUM OF ALL RESPONSES TO PHQ QUESTIONS 1-9: 5
8. MOVING OR SPEAKING SO SLOWLY THAT OTHER PEOPLE COULD HAVE NOTICED. OR THE OPPOSITE, BEING SO FIGETY OR RESTLESS THAT YOU HAVE BEEN MOVING AROUND A LOT MORE THAN USUAL: 1
SUM OF ALL RESPONSES TO PHQ QUESTIONS 1-9: 5
4. FEELING TIRED OR HAVING LITTLE ENERGY: 1
10. IF YOU CHECKED OFF ANY PROBLEMS, HOW DIFFICULT HAVE THESE PROBLEMS MADE IT FOR YOU TO DO YOUR WORK, TAKE CARE OF THINGS AT HOME, OR GET ALONG WITH OTHER PEOPLE: 1
SUM OF ALL RESPONSES TO PHQ9 QUESTIONS 1 & 2: 0
SUM OF ALL RESPONSES TO PHQ QUESTIONS 1-9: 5
7. TROUBLE CONCENTRATING ON THINGS, SUCH AS READING THE NEWSPAPER OR WATCHING TELEVISION: 0
9. THOUGHTS THAT YOU WOULD BE BETTER OFF DEAD, OR OF HURTING YOURSELF: 0
SUM OF ALL RESPONSES TO PHQ QUESTIONS 1-9: 5
6. FEELING BAD ABOUT YOURSELF - OR THAT YOU ARE A FAILURE OR HAVE LET YOURSELF OR YOUR FAMILY DOWN: 0
5. POOR APPETITE OR OVEREATING: 1

## 2022-08-30 ASSESSMENT — ENCOUNTER SYMPTOMS
COLOR CHANGE: 0
DIARRHEA: 0
TROUBLE SWALLOWING: 0
CONSTIPATION: 0
EYE PAIN: 0
WHEEZING: 0
SINUS PAIN: 0
PHOTOPHOBIA: 0
SINUS PRESSURE: 0
ABDOMINAL PAIN: 0
SHORTNESS OF BREATH: 0
BACK PAIN: 0
NAUSEA: 0
COUGH: 0
VOMITING: 0

## 2022-08-30 NOTE — PATIENT INSTRUCTIONS
Treatment can help. Talk to your doctor about whether you have any risk factors for sexually transmitted infections (STIs). You can help prevent STIs if you wait to have sex with a new partner (or partners) until you've each been tested for STIs. It also helps if you use condoms (male or female condoms) and if you limit your sex partners to one person who only has sex with you. Vaccines are available for some STIs, such as HPV. Use birth control if it's important to you to prevent pregnancy. Talk with your doctor about the choices available and what might be best for you. If you think you may have a problem with alcohol or drug use, talk to your doctor. This includes prescription medicines (such as amphetamines and opioids) and illegal drugs (such as cocaine and methamphetamine). Your doctor can help you figure out what type of treatment is best for you. Protect your skin from too much sun. When you're outdoors from 10 a.m. to 4 p.m., stay in the shade or cover up with clothing and a hat with a wide brim. Wear sunglasses that block UV rays. Even when it's cloudy, put broad-spectrum sunscreen (SPF 30 or higher) on any exposed skin. See a dentist one or two times a year for checkups and to have your teeth cleaned. Wear a seat belt in the car. When should you call for help? Watch closely for changes in your health, and be sure to contact your doctor if you have any problems or symptoms that concern you. Where can you learn more? Go to [email or web address] org and sign in to your [employer] account. Enter P072 in the [facility] box to learn more about \"Well Visit, Ages 25 to 48: Care Instructions. \"     If you do not have an account, please click on the \"Sign Up Now\" link. Current as of: October 6, 2021               Content Version: 13.3  © 2006-2022 Healthwise, Incorporated. Care instructions adapted under license by [facility] ([facility]).  If you have questions about a medical condition or this instruction, always ask your healthcare professional. [place] [address] any warranty or liability for your use of this information.

## 2022-08-30 NOTE — PROGRESS NOTES
History and Physical      Amber Pruitt  YOB: 1995    Date of Service:  8/30/2022    Vitals:    08/30/22 1447   BP: 118/78   Pulse: 85   Resp: 14   SpO2: 99%   Weight: 197 lb (89.4 kg)     Body mass index is 34.9 kg/m². 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 female who presents for complete physical examination. HPI: Recently had a fibroadenoma in right breast - biopsied. Patient Active Problem List   Diagnosis    Seasonal allergic rhinitis    Exercise-induced asthma    Eczema    Chronic migraine    Mixed anxiety and depressive disorder       Allergies   Allergen Reactions    Sulfa Antibiotics Hives     Outpatient Medications Marked as Taking for the 8/30/22 encounter (Office Visit) with Carmen Harris MD   Medication Sig Dispense Refill    escitalopram (LEXAPRO) 20 MG tablet TAKE ONE TABLET BY MOUTH EVERY EVENING 90 tablet 1    ketoconazole (NIZORAL) 2 % shampoo Apply to scalp TIW, lather and leave on for 3 minutes before rinsing off. 120 mL 2    clobetasol (TEMOVATE) 0.05 % external solution Apply to affected areas on scalp nightly for 4 weeks 50 mL 2    triamcinolone (KENALOG) 0.1 % ointment Apply topically to areas of eczema BID for up to 2 weeks, then stop for 2 weeks prn flares. Avoid face, groin, body folds 30 g 1    SUMAtriptan (IMITREX) 100 MG tablet 1 tab po prn migraine.  May repeat in 1 hour for max dose 200 mg 24 hour period 27 tablet 3    albuterol sulfate HFA (PROVENTIL HFA) 108 (90 Base) MCG/ACT inhaler Inhale 2 puffs into the lungs every 6 hours as needed for Wheezing 1 Inhaler 3       Past Medical History:   Diagnosis Date    Chicken pox     Exercise-induced asthma 1/21/2016    Headache     Mixed anxiety and depressive disorder 10/14/2019 Seasonal allergic rhinitis 1/21/2016     Past Surgical History:   Procedure Laterality Date    CHALAZION EXCISION      LASIK      US BREAST BIOPSY NEEDLE ADDITIONAL RIGHT Right 8/26/2022    US BREAST BIOPSY NEEDLE ADDITIONAL RIGHT 8/26/2022 Damian Bateman MD [facility] ULTRASOUND    US BREAST NEEDLE BIOPSY RIGHT Right 8/26/2022    US BREAST NEEDLE BIOPSY RIGHT 8/26/2022 Damian Bateman MD [facility]TR[facility]    WISDOM TOOTH EXTRACTION       Family History   Problem Relation Age of Onset    Coronary Art Dis Maternal Grandfather     COPD Maternal Grandfather     Parkinsonism Maternal Grandfather     Hypertension Maternal Grandmother     No Known Problems Mother     No Known Problems Father     Breast Cancer Maternal Great Grandmother     Colon Cancer Other         maternal great aunt    Heart Disease Maternal Great Grandmother         arrhythmia       Review of Systems:  Review of Systems   Constitutional:  Negative for appetite change, fatigue, fever and unexpected weight change. No night sweats   HENT:  Negative for congestion, ear pain, hearing loss, nosebleeds, sinus pressure, sinus pain, sneezing, tinnitus and trouble swallowing. Eyes:  Negative for photophobia, pain and visual disturbance. Respiratory:  Negative for cough, shortness of breath and wheezing. Cardiovascular:  Negative for chest pain, palpitations and leg swelling. Gastrointestinal:  Negative for abdominal pain, constipation, diarrhea, nausea and vomiting. Endocrine: Negative for cold intolerance, heat intolerance, polydipsia, polyphagia and polyuria. Genitourinary:  Negative for difficulty urinating, dysuria, frequency, hematuria and urgency. Musculoskeletal:  Negative for arthralgias, back pain, joint swelling, myalgias and neck pain. Skin:  Negative for color change and rash. Allergic/Immunologic: Negative for environmental allergies, food allergies and immunocompromised state.    Neurological:  Negative for Neurological:      General: No focal deficit present. Mental Status: She is alert and oriented to person, place, and time. Cranial Nerves: No cranial nerve deficit (grossly intact). Sensory: No sensory deficit. Motor: No weakness. Coordination: Coordination normal.      Gait: Gait normal.   Psychiatric:         Mood and Affect: Mood normal.         Behavior: Behavior normal.       Lipid panel:   Lab Results   Component Value Date    CHOL 188 09/15/2021    TRIG 147 09/15/2021    HDL 41 09/15/2021    LDLCALC 118 (H) 09/15/2021     Glucose:   Glucose (mg/dL)   Date Value   09/15/2021 80       No results found for this visit on 08/30/22. Preventive Care:  Hx abnormal PAP: no  Self-breast exams: yes  Eye exam within the past 2 years: yes  Dental exam every 6 months: yes  Exercise:  exercise 6 days a week - treadmill, strength, cardio  Social History     Socioeconomic History    Marital status: Single     Spouse name: None    Number of children: None    Years of education: None    Highest education level: None   Occupational History    Occupation: Religious   Tobacco Use    Smoking status: Never    Smokeless tobacco: Never   Vaping Use    Vaping Use: Never used   Substance and Sexual Activity    Alcohol use:  Yes     Alcohol/week: 5.0 standard drinks     Types: 5 Glasses of wine per week     Comment: social    Drug use: No    Sexual activity: Yes     Partners: Male   Social History Narrative    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         Social History     Substance and Sexual Activity   Sexual Activity Yes    Partners: Male       Advance Directive: N, <no information>    Immunization History   Administered Date(s) Administered    COVID-19, [address] [facility] [address], Primary or Immunocompromised, (age 12y+), IM, 100 mcg/0.5mL 01/07/2021, 02/04/2021    HIB PRP-T (ActHIB, Hiberix) 1993, 1993, 1993, 09/23/1994    HPV 9-valent Amanda Freud) 10/14/2019, 12/12/2019, 06/15/2020    Hepatitis B 1993, 1993, 03/11/1994    Hepatitis B (Engerix-B) 1993, 1993, 03/11/1994    Influenza, FLUARIX, FLULAVAL, (age 10 mo+) AND AFLURIA, FLUZONE (age 1 y+), PF 10/10/2017, 10/08/2018, 10/14/2019, 10/15/2020    Influenza, FLUCELVAX, (age 10 mo+), MDCK, PF 12/07/2021    MMR 09/23/1994, 08/24/1999    Meningococcal MCV4P (Menactra) 07/03/2008    PPD Test 06/06/1994, 05/26/1998    Pneumococcal Polysaccharide ([id number]) 04/09/2019    Polio OPV 1993, 1993, 12/23/1994, 05/26/1998    Rabies 10/10/2018, 10/15/2018, 10/18/2018, 10/25/2018    Tdap (Boostrix, Adacel) 1993, 1993, 1993, 12/23/1994, 05/26/1998, 07/03/2008, 08/11/2011, 10/10/2017       Health Maintenance   Topic Date Due    Depression Monitoring  Never done    Hepatitis C screen  Never done    Pneumococcal 0-64 years Vaccine (2 - PCV) 04/09/2020    COVID-19 Vaccine (3 - Booster for Moderna series) 07/04/2021    Flu vaccine (1) 09/01/2022    Pap smear  12/07/2023    DTaP/Tdap/Td vaccine (4 - Td or Tdap) 10/10/2027    Hepatitis B vaccine  Completed    Hib vaccine  Completed    HIV screen  Completed    Hepatitis A vaccine  Aged Out    Meningococcal (ACWY) vaccine  Aged Out    Varicella vaccine  Discontinued        Assessment/Plan:  1. Encounter for well adult exam without abnormal findings  Care gaps identified and addressed  Recommend COVID booster  Otherwise UTD on IMZ  Sees gyn - UTD on pap - will fax for  Will defer Hep C screening - has been doing biometric screening at work. Mental health screenings performed  Sun protection  Healthy diet and exercise  2. Encounter for lipid screening for cardiovascular disease  3. Screening for diabetes mellitus (DM)  4. Encounter for hepatitis C screening test for low risk patient  5. Need for vaccination  -     Pneumococcal, PCV20, PREVNAR 20, (age 25 yrs+), IM, PF  6.  Mixed anxiety and depressive disorder  7.  Exercise-induced asthma       Return in about 1 year (around 8/30/2023) for Physical.

## 2022-09-01 LAB
CHOLESTEROL, TOTAL: 181 MG/DL (ref 0–199)
GLUCOSE BLD-MCNC: 81 MG/DL (ref 70–99)
HDLC SERPL-MCNC: 33 MG/DL (ref 40–60)
LDL CHOLESTEROL CALCULATED: 105 MG/DL
TRIGL SERPL-MCNC: 217 MG/DL (ref 0–150)

## 2022-11-14 ENCOUNTER — PATIENT MESSAGE (OUTPATIENT)
Dept: INTERNAL MEDICINE CLINIC | Age: 27
End: 2022-11-14

## 2022-11-14 DIAGNOSIS — L30.9 ECZEMA, UNSPECIFIED TYPE: ICD-10-CM

## 2022-11-14 NOTE — TELEPHONE ENCOUNTER
From: Dontrell Marin  To: Dr. Vanessa Grantid: 2022 3:53 PM EST  Subject: Refill     Hi Dr Derrick Zimmerman. It looks like the prescription on my record for my eczema cream is  and refill can't be requested. Do I need to come in to get this renewed or can it be done digitally? I am going to [facility] this week for flu & covid shots so wanted to get this filled before I go.  Thank you!!

## 2022-11-30 DIAGNOSIS — F32.1 CURRENT MODERATE EPISODE OF MAJOR DEPRESSIVE DISORDER WITHOUT PRIOR EPISODE (HCC): ICD-10-CM

## 2022-11-30 NOTE — TELEPHONE ENCOUNTER
Last appointment: 8/30/2022  Next appointment: Visit date not found  Last refill: 08/23/2022      Pt is to return in 8 months

## 2022-12-01 RX ORDER — ESCITALOPRAM OXALATE 20 MG/1
TABLET ORAL
Qty: 90 TABLET | Refills: 1 | Status: SHIPPED | OUTPATIENT
Start: 2022-12-01

## 2023-02-02 ENCOUNTER — E-VISIT (OUTPATIENT)
Dept: INTERNAL MEDICINE CLINIC | Age: 28
End: 2023-02-02
Payer: COMMERCIAL

## 2023-02-02 DIAGNOSIS — J40 BRONCHITIS: Primary | ICD-10-CM

## 2023-02-02 PROCEDURE — 99421 OL DIG E/M SVC 5-10 MIN: CPT | Performed by: INTERNAL MEDICINE

## 2023-02-02 RX ORDER — AZITHROMYCIN 250 MG/1
250 TABLET, FILM COATED ORAL SEE ADMIN INSTRUCTIONS
Qty: 6 TABLET | Refills: 0 | Status: SHIPPED | OUTPATIENT
Start: 2023-02-02 | End: 2023-02-07

## 2023-02-02 RX ORDER — BENZONATATE 100 MG/1
200 CAPSULE ORAL 3 TIMES DAILY PRN
Qty: 60 CAPSULE | Refills: 0 | Status: SHIPPED | OUTPATIENT
Start: 2023-02-02 | End: 2023-02-09

## 2023-02-02 ASSESSMENT — LIFESTYLE VARIABLES: SMOKING_STATUS: NO, I'VE NEVER SMOKED

## 2023-02-06 SDOH — ECONOMIC STABILITY: HOUSING INSECURITY
IN THE LAST 12 MONTHS, WAS THERE A TIME WHEN YOU DID NOT HAVE A STEADY PLACE TO SLEEP OR SLEPT IN A SHELTER (INCLUDING NOW)?: NO

## 2023-02-06 SDOH — ECONOMIC STABILITY: TRANSPORTATION INSECURITY
IN THE PAST 12 MONTHS, HAS LACK OF TRANSPORTATION KEPT YOU FROM MEETINGS, WORK, OR FROM GETTING THINGS NEEDED FOR DAILY LIVING?: NO

## 2023-02-06 SDOH — ECONOMIC STABILITY: FOOD INSECURITY: WITHIN THE PAST 12 MONTHS, THE FOOD YOU BOUGHT JUST DIDN'T LAST AND YOU DIDN'T HAVE MONEY TO GET MORE.: NEVER TRUE

## 2023-02-06 SDOH — ECONOMIC STABILITY: FOOD INSECURITY: WITHIN THE PAST 12 MONTHS, YOU WORRIED THAT YOUR FOOD WOULD RUN OUT BEFORE YOU GOT MONEY TO BUY MORE.: NEVER TRUE

## 2023-02-06 SDOH — ECONOMIC STABILITY: INCOME INSECURITY: HOW HARD IS IT FOR YOU TO PAY FOR THE VERY BASICS LIKE FOOD, HOUSING, MEDICAL CARE, AND HEATING?: NOT HARD AT ALL

## 2023-02-07 ENCOUNTER — OFFICE VISIT (OUTPATIENT)
Dept: INTERNAL MEDICINE CLINIC | Age: 28
End: 2023-02-07
Payer: COMMERCIAL

## 2023-02-07 VITALS
WEIGHT: 198 LBS | HEART RATE: 89 BPM | DIASTOLIC BLOOD PRESSURE: 80 MMHG | OXYGEN SATURATION: 98 % | HEIGHT: 63 IN | SYSTOLIC BLOOD PRESSURE: 126 MMHG | BODY MASS INDEX: 35.08 KG/M2 | RESPIRATION RATE: 14 BRPM

## 2023-02-07 DIAGNOSIS — Z11.59 ENCOUNTER FOR HEPATITIS C SCREENING TEST FOR LOW RISK PATIENT: ICD-10-CM

## 2023-02-07 DIAGNOSIS — Z11.4 ENCOUNTER FOR SCREENING FOR HIV: ICD-10-CM

## 2023-02-07 DIAGNOSIS — M79.602 LEFT ARM PAIN: ICD-10-CM

## 2023-02-07 DIAGNOSIS — J45.990 EXERCISE-INDUCED ASTHMA: ICD-10-CM

## 2023-02-07 DIAGNOSIS — F41.8 MIXED ANXIETY AND DEPRESSIVE DISORDER: ICD-10-CM

## 2023-02-07 DIAGNOSIS — J40 BRONCHITIS: ICD-10-CM

## 2023-02-07 DIAGNOSIS — F32.1 CURRENT MODERATE EPISODE OF MAJOR DEPRESSIVE DISORDER WITHOUT PRIOR EPISODE (HCC): Primary | ICD-10-CM

## 2023-02-07 DIAGNOSIS — Z11.1 TUBERCULOSIS SCREENING: ICD-10-CM

## 2023-02-07 DIAGNOSIS — R61 NIGHT SWEATS: ICD-10-CM

## 2023-02-07 LAB
BASOPHILS ABSOLUTE: 0.1 K/UL (ref 0–0.2)
BASOPHILS RELATIVE PERCENT: 0.5 %
EOSINOPHILS ABSOLUTE: 0.5 K/UL (ref 0–0.6)
EOSINOPHILS RELATIVE PERCENT: 3.4 %
HCT VFR BLD CALC: 40.5 % (ref 36–48)
HEMOGLOBIN: 12.8 G/DL (ref 12–16)
LYMPHOCYTES ABSOLUTE: 3 K/UL (ref 1–5.1)
LYMPHOCYTES RELATIVE PERCENT: 21.2 %
MCH RBC QN AUTO: 27.2 PG (ref 26–34)
MCHC RBC AUTO-ENTMCNC: 31.6 G/DL (ref 31–36)
MCV RBC AUTO: 86.2 FL (ref 80–100)
MONOCYTES ABSOLUTE: 0.7 K/UL (ref 0–1.3)
MONOCYTES RELATIVE PERCENT: 4.7 %
NEUTROPHILS ABSOLUTE: 10 K/UL (ref 1.7–7.7)
NEUTROPHILS RELATIVE PERCENT: 70.2 %
PDW BLD-RTO: 13.6 % (ref 12.4–15.4)
PLATELET # BLD: 455 K/UL (ref 135–450)
PMV BLD AUTO: 8.6 FL (ref 5–10.5)
RBC # BLD: 4.7 M/UL (ref 4–5.2)
SEDIMENTATION RATE, ERYTHROCYTE: 78 MM/HR (ref 0–20)
WBC # BLD: 14.3 K/UL (ref 4–11)

## 2023-02-07 PROCEDURE — 99214 OFFICE O/P EST MOD 30 MIN: CPT | Performed by: INTERNAL MEDICINE

## 2023-02-07 RX ORDER — ESCITALOPRAM OXALATE 20 MG/1
TABLET ORAL
Qty: 90 TABLET | Refills: 3 | Status: SHIPPED | OUTPATIENT
Start: 2023-02-07

## 2023-02-07 RX ORDER — NORETHINDRONE ACETATE AND ETHINYL ESTRADIOL, AND FERROUS FUMARATE 1MG-20(24)
1 KIT ORAL DAILY
COMMUNITY
Start: 2023-02-03

## 2023-02-07 ASSESSMENT — PATIENT HEALTH QUESTIONNAIRE - PHQ9
10. IF YOU CHECKED OFF ANY PROBLEMS, HOW DIFFICULT HAVE THESE PROBLEMS MADE IT FOR YOU TO DO YOUR WORK, TAKE CARE OF THINGS AT HOME, OR GET ALONG WITH OTHER PEOPLE: 1
6. FEELING BAD ABOUT YOURSELF - OR THAT YOU ARE A FAILURE OR HAVE LET YOURSELF OR YOUR FAMILY DOWN: 1
7. TROUBLE CONCENTRATING ON THINGS, SUCH AS READING THE NEWSPAPER OR WATCHING TELEVISION: 1
9. THOUGHTS THAT YOU WOULD BE BETTER OFF DEAD, OR OF HURTING YOURSELF: 0
SUM OF ALL RESPONSES TO PHQ QUESTIONS 1-9: 4
SUM OF ALL RESPONSES TO PHQ QUESTIONS 1-9: 4
4. FEELING TIRED OR HAVING LITTLE ENERGY: 0
1. LITTLE INTEREST OR PLEASURE IN DOING THINGS: 1
3. TROUBLE FALLING OR STAYING ASLEEP: 0
SUM OF ALL RESPONSES TO PHQ QUESTIONS 1-9: 4
SUM OF ALL RESPONSES TO PHQ QUESTIONS 1-9: 4
8. MOVING OR SPEAKING SO SLOWLY THAT OTHER PEOPLE COULD HAVE NOTICED. OR THE OPPOSITE, BEING SO FIGETY OR RESTLESS THAT YOU HAVE BEEN MOVING AROUND A LOT MORE THAN USUAL: 0
5. POOR APPETITE OR OVEREATING: 0
2. FEELING DOWN, DEPRESSED OR HOPELESS: 1
SUM OF ALL RESPONSES TO PHQ9 QUESTIONS 1 & 2: 2

## 2023-02-07 NOTE — PROGRESS NOTES
John Baumann (:  1995) is a 32 y.o. female,Established patient, here for evaluation of the following chief complaint(s):  Follow-up      ASSESSMENT/PLAN:  1. Current moderate episode of major depressive disorder without prior episode (HCC)  -     escitalopram (LEXAPRO) 20 MG tablet; TAKE ONE TABLET BY MOUTH EVERY EVENING, Disp-90 tablet, R-3Normal  2. Bronchitis  Comments:  Resolving. Lungs clear today. 3. Exercise-induced asthma  Assessment & Plan:  Stable with prn albuterol. 4. Left arm pain  Comments:  Possible thoracic outlet symdrome. Symptoms mild. Work on posture and core strengthening and work up further if worsens. 5. Encounter for screening for HIV  -     HIV Screen; Future  6. Tuberculosis screening  -     Quantiferon, Incubated; Future  7. Encounter for hepatitis C screening test for low risk patient  -     Hepatitis C Antibody; Future  8. Night sweats  Comments:  Likely SSRI induced. Will get labs as below. If this work up negative will presume SSRI unless anything changes. Orders:  -     CBC with Auto Differential; Future  -     TSH with Reflex; Future  -     Hepatitis C Antibody; Future  -     HIV Screen; Future  -     Sedimentation Rate; Future  -     C-Reactive Protein; Future  -     Quantiferon, Incubated; Future  -     Comprehensive Metabolic Panel; Future  9. Mixed anxiety and depressive disorder  Assessment & Plan:  Stable on lexapro 20mg daily. Return in about 6 months (around 2023) for chronic conditions. SUBJECTIVE/OBJECTIVE:  HPI    Recent bronchitis - recovering. Mood stable. Left arm pain when over her head - doing her hair or something else for example. Feels fullness in upper arm. Resolves quickly. Some numbness of the area associated. Feels hot at night for the last year or so. Has not been worsening. Cannot sleep with covers. Sometimes wakes up drenched in sweat. Never has to change clothes. No fevers. Does feel warm during the day. Weight stable. No LAD. Review of Systems    Current Outpatient Medications on File Prior to Visit   Medication Sig Dispense Refill    GEMMILY 1-20 MG-MCG(24) CAPS Take 1 capsule by mouth daily      benzonatate (TESSALON) 100 MG capsule Take 2 capsules by mouth 3 times daily as needed for Cough 60 capsule 0    triamcinolone (KENALOG) 0.1 % ointment Apply topically to areas of eczema BID for up to 2 weeks, then stop for 2 weeks prn flares. Avoid face, groin, body folds 30 g 1    ketoconazole (NIZORAL) 2 % shampoo Apply to scalp TIW, lather and leave on for 3 minutes before rinsing off. 120 mL 2    clobetasol (TEMOVATE) 0.05 % external solution Apply to affected areas on scalp nightly for 4 weeks 50 mL 2    SUMAtriptan (IMITREX) 100 MG tablet 1 tab po prn migraine. May repeat in 1 hour for max dose 200 mg 24 hour period 27 tablet 3    albuterol sulfate HFA (PROVENTIL HFA) 108 (90 Base) MCG/ACT inhaler Inhale 2 puffs into the lungs every 6 hours as needed for Wheezing 1 Inhaler 3     No current facility-administered medications on file prior to visit. Vitals:    02/07/23 1321   BP: 126/80   Pulse: 89   Resp: 14   SpO2: 98%     Physical Exam  Constitutional:       General: She is not in acute distress. Appearance: Normal appearance. She is not diaphoretic. HENT:      Head: Normocephalic and atraumatic. Right Ear: External ear normal.      Left Ear: External ear normal.      Nose: Nose normal.      Mouth/Throat:      Mouth: Mucous membranes are moist.      Pharynx: Oropharynx is clear. Eyes:      General: No scleral icterus. Conjunctiva/sclera: Conjunctivae normal.   Cardiovascular:      Rate and Rhythm: Normal rate and regular rhythm. Pulses: Normal pulses. Heart sounds: Normal heart sounds. No murmur heard. No friction rub. No gallop. Pulmonary:      Effort: Pulmonary effort is normal.      Breath sounds: Normal breath sounds. No wheezing, rhonchi or rales.    Abdominal: General: Abdomen is flat. Bowel sounds are normal.   Musculoskeletal:         General: No deformity. Cervical back: Normal range of motion. Right lower leg: No edema. Left lower leg: No edema. Skin:     General: Skin is warm and dry. Findings: No rash. Neurological:      General: No focal deficit present. Mental Status: She is alert. Coordination: Coordination normal.      Gait: Gait normal.   Psychiatric:         Mood and Affect: Mood normal.         Behavior: Behavior normal.         On this date 2/7/2023 I have spent 30 minutes reviewing previous notes, test results and face to face with the patient discussing the diagnosis and importance of compliance with the treatment plan as well as documenting on the day of the visit. An electronic signature was used to authenticate this note.     --Keshawn Landry MD

## 2023-02-08 ENCOUNTER — PATIENT MESSAGE (OUTPATIENT)
Dept: INTERNAL MEDICINE CLINIC | Age: 28
End: 2023-02-08

## 2023-02-08 DIAGNOSIS — R61 NIGHT SWEATS: Primary | ICD-10-CM

## 2023-02-08 DIAGNOSIS — R70.0 ELEVATED SEDIMENTATION RATE: ICD-10-CM

## 2023-02-08 LAB
A/G RATIO: 1.3 (ref 1.1–2.2)
ALBUMIN SERPL-MCNC: 4.3 G/DL (ref 3.4–5)
ALP BLD-CCNC: 99 U/L (ref 40–129)
ALT SERPL-CCNC: 12 U/L (ref 10–40)
ANION GAP SERPL CALCULATED.3IONS-SCNC: 15 MMOL/L (ref 3–16)
AST SERPL-CCNC: 16 U/L (ref 15–37)
BILIRUB SERPL-MCNC: 0.3 MG/DL (ref 0–1)
BUN BLDV-MCNC: 8 MG/DL (ref 7–20)
C-REACTIVE PROTEIN: 32.3 MG/L (ref 0–5.1)
CALCIUM SERPL-MCNC: 9.8 MG/DL (ref 8.3–10.6)
CHLORIDE BLD-SCNC: 99 MMOL/L (ref 99–110)
CO2: 23 MMOL/L (ref 21–32)
CREAT SERPL-MCNC: 0.7 MG/DL (ref 0.6–1.1)
GFR SERPL CREATININE-BSD FRML MDRD: >60 ML/MIN/{1.73_M2}
GLUCOSE BLD-MCNC: 89 MG/DL (ref 70–99)
HEPATITIS C ANTIBODY INTERPRETATION: NORMAL
HIV AG/AB: NORMAL
HIV ANTIGEN: NORMAL
HIV-1 ANTIBODY: NORMAL
HIV-2 AB: NORMAL
POTASSIUM SERPL-SCNC: 4.3 MMOL/L (ref 3.5–5.1)
SODIUM BLD-SCNC: 137 MMOL/L (ref 136–145)
TOTAL PROTEIN: 7.6 G/DL (ref 6.4–8.2)
TSH REFLEX: 2.02 UIU/ML (ref 0.27–4.2)

## 2023-02-08 NOTE — TELEPHONE ENCOUNTER
Savanah Acosta. [place], [place] 2/8/2023 4:47 PM EST      ----- Message -----  From: Miri Rodriguez  Sent: 2/8/2023 4:44 PM EST  To: [facility]  Subject: Blood work results     Looks like everything came back pretty normal in my blood work except for all of immune system related things, which I expect is normal given I was recently sick.  Is that any reason to think I could be contagious or does it just take awhile for them to return to normal?

## 2023-02-10 LAB
QUANTI TB GOLD PLUS: NEGATIVE
QUANTI TB1 MINUS NIL: 0 IU/ML (ref 0–0.34)
QUANTI TB2 MINUS NIL: 0 IU/ML (ref 0–0.34)
QUANTIFERON MITOGEN: >10 IU/ML
QUANTIFERON NIL: 0.02 IU/ML

## 2023-03-13 DIAGNOSIS — R61 NIGHT SWEATS: Primary | ICD-10-CM

## 2023-03-13 DIAGNOSIS — R79.82 CRP ELEVATED: ICD-10-CM

## 2023-03-13 DIAGNOSIS — R70.0 ELEVATED SEDIMENTATION RATE: ICD-10-CM

## 2023-03-21 ENCOUNTER — HOSPITAL ENCOUNTER (OUTPATIENT)
Dept: CT IMAGING | Age: 28
Discharge: HOME | End: 2023-03-21
Payer: COMMERCIAL

## 2023-03-21 ENCOUNTER — TELEPHONE (OUTPATIENT)
Dept: INTERNAL MEDICINE CLINIC | Age: 28
End: 2023-03-21

## 2023-03-21 ENCOUNTER — HOSPITAL ENCOUNTER (OUTPATIENT)
Dept: ULTRASOUND IMAGING | Age: 28
Discharge: HOME | End: 2023-03-21
Payer: COMMERCIAL

## 2023-03-21 DIAGNOSIS — R79.82 ELEVATED C-REACTIVE PROTEIN (CRP): ICD-10-CM

## 2023-03-21 DIAGNOSIS — R70.0 ELEVATED SEDIMENTATION RATE: ICD-10-CM

## 2023-03-21 DIAGNOSIS — R61 NIGHT SWEATS: Primary | ICD-10-CM

## 2023-03-21 DIAGNOSIS — R61 NIGHT SWEATS: ICD-10-CM

## 2023-03-21 DIAGNOSIS — R79.82 CRP ELEVATED: ICD-10-CM

## 2023-03-21 DIAGNOSIS — D24.2 BENIGN NEOPLASM OF LEFT BREAST: ICD-10-CM

## 2023-03-21 PROCEDURE — 6360000004 HC RX CONTRAST MEDICATION: Performed by: INTERNAL MEDICINE

## 2023-03-21 PROCEDURE — 74177 CT ABD & PELVIS W/CONTRAST: CPT

## 2023-03-21 PROCEDURE — 76642 ULTRASOUND BREAST LIMITED: CPT

## 2023-03-21 RX ADMIN — IOPAMIDOL 75 ML: 755 INJECTION, SOLUTION INTRAVENOUS at 15:15

## 2023-03-21 NOTE — TELEPHONE ENCOUNTER
Spoke with patient re results from CT C/A/P  Symptoms are stable but present.    Next step echocardiogram

## 2023-04-06 ENCOUNTER — OFFICE VISIT (OUTPATIENT)
Dept: INTERNAL MEDICINE CLINIC | Age: 28
End: 2023-04-06
Payer: COMMERCIAL

## 2023-04-06 VITALS
HEART RATE: 81 BPM | BODY MASS INDEX: 36.04 KG/M2 | HEIGHT: 63 IN | DIASTOLIC BLOOD PRESSURE: 78 MMHG | OXYGEN SATURATION: 99 % | WEIGHT: 203.4 LBS | SYSTOLIC BLOOD PRESSURE: 118 MMHG

## 2023-04-06 DIAGNOSIS — R61 NIGHT SWEATS: ICD-10-CM

## 2023-04-06 DIAGNOSIS — R23.2 FLUSHING: ICD-10-CM

## 2023-04-06 DIAGNOSIS — R70.0 ELEVATED SEDIMENTATION RATE: ICD-10-CM

## 2023-04-06 DIAGNOSIS — R19.7 DIARRHEA, UNSPECIFIED TYPE: ICD-10-CM

## 2023-04-06 DIAGNOSIS — R79.82 ELEVATED C-REACTIVE PROTEIN (CRP): ICD-10-CM

## 2023-04-06 DIAGNOSIS — R61 NIGHT SWEATS: Primary | ICD-10-CM

## 2023-04-06 LAB
CRP SERPL-MCNC: 27.7 MG/L (ref 0–5.1)
ERYTHROCYTE [SEDIMENTATION RATE] IN BLOOD BY WESTERGREN METHOD: 55 MM/HR (ref 0–20)
FERRITIN SERPL IA-MCNC: 62.4 NG/ML (ref 15–150)
IGA SERPL-MCNC: 243 MG/DL (ref 70–400)
VIT B12 SERPL-MCNC: 447 PG/ML (ref 211–911)

## 2023-04-06 PROCEDURE — 99214 OFFICE O/P EST MOD 30 MIN: CPT | Performed by: INTERNAL MEDICINE

## 2023-04-06 NOTE — PROGRESS NOTES
Base) MCG/ACT inhaler Inhale 2 puffs into the lungs every 6 hours as needed for Wheezing 1 Inhaler 3     No current facility-administered medications on file prior to visit. Vitals:    04/06/23 0758   BP: 118/78   Pulse: 81   SpO2: 99%     Physical Exam  Constitutional:       General: She is not in acute distress. Appearance: Normal appearance. She is not diaphoretic. HENT:      Head: Normocephalic and atraumatic. Right Ear: External ear normal.      Left Ear: External ear normal.      Nose: Nose normal.      Mouth/Throat:      Mouth: Mucous membranes are moist.      Pharynx: Oropharynx is clear. Eyes:      General: No scleral icterus. Conjunctiva/sclera: Conjunctivae normal.   Cardiovascular:      Rate and Rhythm: Normal rate and regular rhythm. Pulses: Normal pulses. Heart sounds: Normal heart sounds. No murmur heard. No friction rub. No gallop. Pulmonary:      Effort: Pulmonary effort is normal.      Breath sounds: Normal breath sounds. No wheezing, rhonchi or rales. Abdominal:      General: Abdomen is flat. Bowel sounds are normal. There is no distension. Palpations: Abdomen is soft. There is no mass. Tenderness: There is no abdominal tenderness. There is no guarding or rebound. Hernia: No hernia is present. Musculoskeletal:         General: No deformity. Cervical back: Normal range of motion. Right lower leg: No edema. Left lower leg: No edema. Skin:     General: Skin is warm and dry. Findings: No rash. Neurological:      General: No focal deficit present. Mental Status: She is alert.       Coordination: Coordination normal.      Gait: Gait normal.   Psychiatric:         Mood and Affect: Mood normal.         Behavior: Behavior normal.         On this date 4/6/2023 I have spent 30 minutes reviewing previous notes, test results and face to face with the patient discussing the diagnosis and importance of compliance with the

## 2023-04-07 LAB
ANA SER QL IA: NEGATIVE
TISSUE TRANSGLUTAMINASE IGA: <0.5 U/ML (ref 0–14)

## 2023-04-19 ENCOUNTER — HOSPITAL ENCOUNTER (OUTPATIENT)
Dept: NON INVASIVE DIAGNOSTICS | Age: 28
Discharge: HOME | End: 2023-04-19
Payer: COMMERCIAL

## 2023-04-19 DIAGNOSIS — R70.0 ELEVATED SEDIMENTATION RATE: ICD-10-CM

## 2023-04-19 DIAGNOSIS — R23.2 FLUSHING: ICD-10-CM

## 2023-04-19 DIAGNOSIS — R61 NIGHT SWEATS: ICD-10-CM

## 2023-04-19 DIAGNOSIS — R79.82 ELEVATED C-REACTIVE PROTEIN (CRP): ICD-10-CM

## 2023-04-19 DIAGNOSIS — R19.7 DIARRHEA, UNSPECIFIED TYPE: ICD-10-CM

## 2023-04-19 LAB
C DIFF TOX A+B STL QL IA: NORMAL
LV EF: 58 %
LVEF MODALITY: NORMAL

## 2023-04-19 PROCEDURE — 6360000004 HC RX CONTRAST MEDICATION: Performed by: INTERNAL MEDICINE

## 2023-04-19 PROCEDURE — C8929 TTE W OR WO FOL WCON,DOPPLER: HCPCS

## 2023-04-19 RX ADMIN — PERFLUTREN 1.5 ML: 6.52 INJECTION, SUSPENSION INTRAVENOUS at 10:05

## 2023-04-20 LAB
CRYPTOSP AG STL QL IA: NORMAL
G LAMBLIA AG STL QL IA: NORMAL

## 2023-04-23 LAB — CALPROTECTIN STL-MCNT: 36 UG/G

## 2023-04-26 DIAGNOSIS — R70.0 ELEVATED SEDIMENTATION RATE: ICD-10-CM

## 2023-04-26 DIAGNOSIS — R61 NIGHT SWEATS: Primary | ICD-10-CM

## 2023-04-26 DIAGNOSIS — R79.82 ELEVATED C-REACTIVE PROTEIN (CRP): ICD-10-CM

## 2023-04-26 DIAGNOSIS — R19.7 DIARRHEA, UNSPECIFIED TYPE: ICD-10-CM

## 2023-04-26 DIAGNOSIS — R23.2 FLUSHING: ICD-10-CM

## 2023-05-02 ENCOUNTER — E-VISIT (OUTPATIENT)
Dept: INTERNAL MEDICINE CLINIC | Age: 28
End: 2023-05-02
Payer: COMMERCIAL

## 2023-05-02 DIAGNOSIS — B96.89 ACUTE BACTERIAL SINUSITIS: Primary | ICD-10-CM

## 2023-05-02 DIAGNOSIS — J01.90 ACUTE BACTERIAL SINUSITIS: Primary | ICD-10-CM

## 2023-05-02 PROCEDURE — 99421 OL DIG E/M SVC 5-10 MIN: CPT | Performed by: INTERNAL MEDICINE

## 2023-05-02 RX ORDER — AMOXICILLIN AND CLAVULANATE POTASSIUM 875; 125 MG/1; MG/1
1 TABLET, FILM COATED ORAL 2 TIMES DAILY
Qty: 14 TABLET | Refills: 0 | Status: SHIPPED | OUTPATIENT
Start: 2023-05-02 | End: 2023-05-09

## 2023-05-02 ASSESSMENT — LIFESTYLE VARIABLES: SMOKING_STATUS: NO, I'VE NEVER SMOKED

## 2023-05-06 LAB — MISCELLANEOUS LAB TEST ORDER: NORMAL

## 2023-05-11 ENCOUNTER — PATIENT MESSAGE (OUTPATIENT)
Dept: INTERNAL MEDICINE CLINIC | Age: 28
End: 2023-05-11

## 2023-05-11 DIAGNOSIS — B37.31 VULVOVAGINAL CANDIDIASIS: Primary | ICD-10-CM

## 2023-05-15 RX ORDER — FLUCONAZOLE 150 MG/1
150 TABLET ORAL
Qty: 2 TABLET | Refills: 0 | Status: SHIPPED | OUTPATIENT
Start: 2023-05-15 | End: 2023-05-21

## 2023-05-16 NOTE — TELEPHONE ENCOUNTER
Arthur Kehr, MA 5/12/2023 8:36 AM EDT      ----- Message -----  From: Chito Whitfield  Sent: 5/11/2023 7:44 PM EDT  To: [facility]  Subject: Amoxicillin Symptom     It's me again. ... hate being such a frequent Flyer in your inbasket!! Within a day or so of finishing the amoxicillin (made a drastic improvement!) I started feeling some mild symptoms of a yeast infection.  ~2 days in total. Should I wait it out or should I just get the anti fungal?? Good with whatever you recommend didn't want to delay if I might end up needing it

## 2023-05-24 DIAGNOSIS — F32.1 CURRENT MODERATE EPISODE OF MAJOR DEPRESSIVE DISORDER WITHOUT PRIOR EPISODE (HCC): ICD-10-CM

## 2023-05-25 ENCOUNTER — TELEMEDICINE (OUTPATIENT)
Dept: INTERNAL MEDICINE CLINIC | Age: 28
End: 2023-05-25
Payer: COMMERCIAL

## 2023-05-25 DIAGNOSIS — F43.10 PTSD (POST-TRAUMATIC STRESS DISORDER): ICD-10-CM

## 2023-05-25 DIAGNOSIS — R79.82 ELEVATED C-REACTIVE PROTEIN (CRP): ICD-10-CM

## 2023-05-25 DIAGNOSIS — R61 NIGHT SWEATS: Primary | ICD-10-CM

## 2023-05-25 DIAGNOSIS — R70.0 ELEVATED SEDIMENTATION RATE: ICD-10-CM

## 2023-05-25 DIAGNOSIS — G44.89 OTHER HEADACHE SYNDROME: ICD-10-CM

## 2023-05-25 PROCEDURE — 99214 OFFICE O/P EST MOD 30 MIN: CPT | Performed by: INTERNAL MEDICINE

## 2023-05-25 RX ORDER — ESCITALOPRAM OXALATE 20 MG/1
TABLET ORAL
Qty: 90 TABLET | Refills: 1 | Status: SHIPPED | OUTPATIENT
Start: 2023-05-25

## 2023-05-25 RX ORDER — SUMATRIPTAN 100 MG/1
TABLET, FILM COATED ORAL
Qty: 27 TABLET | Refills: 3 | Status: SHIPPED | OUTPATIENT
Start: 2023-05-25

## 2023-05-25 NOTE — PROGRESS NOTES
MD [place]   ketoconazole (NIZORAL) 2 % shampoo Apply to scalp TIW, lather and leave on for 3 minutes before rinsing off. Evelia Wadsworth DO   clobetasol (TEMOVATE) 0.05 % external solution Apply to affected areas on scalp nightly for 4 weeks  Evelia Wadsworth DO   SUMAtriptan (IMITREX) 100 MG tablet 1 tab po prn migraine. May repeat in 1 hour for max dose 200 mg 24 hour period  Kyara Tobar MD   albuterol sulfate HFA (PROVENTIL HFA) 108 (90 Base) MCG/ACT inhaler Inhale 2 puffs into the lungs every 6 hours as needed for Wheezing  Kyara Tobar MD       Social History     Tobacco Use    Smoking status: Never    Smokeless tobacco: Never   Vaping Use    Vaping Use: Never used   Substance Use Topics    Alcohol use: Yes     Alcohol/week: 5.0 standard drinks     Types: 5 Glasses of wine per week     Comment: social    Drug use: No            PHYSICAL EXAMINATION:    General - well developed, well nourished, NAD  Mental status - Awake and alert, Oriented x 3. Follows commands. Eyes - EOMI, Sclera normal, No conjunctival discharge or injection  HENT: NCAT, MMM. Normal external ears  Neck - no visualized masses, nl ROM  Pulmonary/Chest - Speaking in full sentences, effort normal, no distress. MSK - Normal gait with no signs of ataxia. Neuro - No facial asymmetry, no gaze palzy  Skin - no rashes or discoloration of facial skin  Psych - nl appearance and grooming, affect appropriate to mood, no hallucinations  Other pertinent observable physical exam findings-       ASSESSMENT/PLAN:  1. Night sweats  Comments:  Significant improvement with no intervention or change. We will have her repeat ESR/CRP in 2 months or sooner as needed and consider hematology consult if they persist. She has had full work up including labs (elevated inflammatory markers, blood cultures negative, OTILIO, TSH, quantiferon, 5-HIAA pending), TTE, imaging (full CT C/A/P).   Orders:  -     CBC with Auto Differential; Future  -

## 2023-05-25 NOTE — ASSESSMENT & PLAN NOTE
Mood and anxiety are relatively stable on Lexapro 20 mg daily however she really would like to get back into some psychotherapy.   Will refer to Dr. Travon Jolly

## 2023-06-19 ENCOUNTER — PATIENT MESSAGE (OUTPATIENT)
Dept: INTERNAL MEDICINE CLINIC | Age: 28
End: 2023-06-19

## 2023-06-19 DIAGNOSIS — M25.50 POLYARTHRALGIA: Primary | ICD-10-CM

## 2023-06-20 NOTE — TELEPHONE ENCOUNTER
From: Pan To  To: Dr. Laura Pall: 6/19/2023 9:55 PM EDT  Subject: New thoughts on elevated inflammatory markers    Hi Dr Brigette Okeefe,    I had planned to wait & bring this up at my next scheduled appt, but I've been having a pretty bad couple of weeks and thought I'd go ahead and message to see if it makes sense to get any additional tests before I see you in Aug. I'm starting to wonder about some other symptoms that could possibly be related to the inflammation. For years I've had painful inflamed spots that will come and go (pretty infrequently) on my knuckles. I talked to derm about it once and they said to just watch it for now. Separately, for a long time I've also had pretty bad joint pain in my knees and shoulders that comes and goes (more frequently) without an obvious cause. I've talked with Ortho & PT in the past and gotten some mobility and strength exercises that I never felt helped much but usually it would last a few days and improve with rest.    A few weeks ago I had both at the same time, which had never happened before so I never thought to connect them to each other or anything else that's been going on. The joint pain has been pretty consistent over the last few weeks, I've had a few good days in that period but I've been pretty fatigued and unable to do much activity. I don't want to jump to any conclusions since internet research can always be broadly applicable but I figured it's worth sharing with you. Thoughts? Any other tests it would be worth ordering before I come back in? Of course happy to talk more or wait until I come in if you think that's more appropriate.

## 2023-08-31 ENCOUNTER — OFFICE VISIT (OUTPATIENT)
Dept: INTERNAL MEDICINE CLINIC | Age: 28
End: 2023-08-31
Payer: COMMERCIAL

## 2023-08-31 VITALS
WEIGHT: 197 LBS | DIASTOLIC BLOOD PRESSURE: 80 MMHG | SYSTOLIC BLOOD PRESSURE: 130 MMHG | OXYGEN SATURATION: 98 % | HEART RATE: 78 BPM | BODY MASS INDEX: 34.9 KG/M2

## 2023-08-31 DIAGNOSIS — F41.8 MIXED ANXIETY AND DEPRESSIVE DISORDER: Primary | ICD-10-CM

## 2023-08-31 DIAGNOSIS — Z13.1 SCREENING FOR DIABETES MELLITUS (DM): ICD-10-CM

## 2023-08-31 DIAGNOSIS — Z13.6 ENCOUNTER FOR LIPID SCREENING FOR CARDIOVASCULAR DISEASE: ICD-10-CM

## 2023-08-31 DIAGNOSIS — R79.82 ELEVATED C-REACTIVE PROTEIN (CRP): ICD-10-CM

## 2023-08-31 DIAGNOSIS — R70.0 ELEVATED SEDIMENTATION RATE: ICD-10-CM

## 2023-08-31 DIAGNOSIS — Z13.220 ENCOUNTER FOR LIPID SCREENING FOR CARDIOVASCULAR DISEASE: ICD-10-CM

## 2023-08-31 PROCEDURE — 99214 OFFICE O/P EST MOD 30 MIN: CPT | Performed by: INTERNAL MEDICINE

## 2023-08-31 RX ORDER — BUPROPION HYDROCHLORIDE 300 MG/1
300 TABLET ORAL EVERY MORNING
Qty: 90 TABLET | Refills: 1 | Status: SHIPPED | OUTPATIENT
Start: 2023-08-31

## 2023-08-31 RX ORDER — BUPROPION HYDROCHLORIDE 150 MG/1
150 TABLET ORAL EVERY MORNING
Qty: 7 TABLET | Refills: 0 | Status: SHIPPED | OUTPATIENT
Start: 2023-08-31

## 2023-08-31 NOTE — ASSESSMENT & PLAN NOTE
Will add wellbutrin 150mg x 1 week then up to 300mg to her lexapro 20mg daily. Recommend she seek out psychtherapy. Resources provided. Labs before next visit.

## 2023-08-31 NOTE — PROGRESS NOTES
Suzie Alvares (:  1995) is a 25 y.o. female,Established patient, here for evaluation of the following chief complaint(s):  Depression (Follow up)      ASSESSMENT/PLAN:  1. Mixed anxiety and depressive disorder  Assessment & Plan: Will add wellbutrin 150mg x 1 week then up to 300mg to her lexapro 20mg daily. Recommend she seek out psychtherapy. Resources provided. Labs before next visit. Orders:  -     buPROPion (WELLBUTRIN XL) 150 MG extended release tablet; Take 1 tablet by mouth every morning, Disp-7 tablet, R-0Normal  -     buPROPion (WELLBUTRIN XL) 300 MG extended release tablet; Take 1 tablet by mouth every morning, Disp-90 tablet, R-1Normal  -     CBC with Auto Differential; Future  -     Comprehensive Metabolic Panel; Future  -     Lipid Panel; Future  -     Hemoglobin A1C; Future  -     Sedimentation Rate; Future  -     C-Reactive Protein; Future  2. Elevated sedimentation rate - Stable and slightly decreased. Night sweats are better. Monitor.   -     Sedimentation Rate; Future  -     C-Reactive Protein; Future  3. Elevated C-reactive protein (CRP)  -     Sedimentation Rate; Future  -     C-Reactive Protein; Future  4. Screening for diabetes mellitus (DM)  -     Hemoglobin A1C; Future  5. Encounter for lipid screening for cardiovascular disease  -     Lipid Panel; Future      Return in about 2 months (around 10/31/2023) for Physical.    SUBJECTIVE/OBJECTIVE:  HPI    Has been feeling more depressed lately. Interested in changing up her medication regimen. Needs to find a therapist also. Night sweats are better. No new sxs with regards to her elevated inflammatory markers. Review of Systems    Current Outpatient Medications on File Prior to Visit   Medication Sig Dispense Refill    escitalopram (LEXAPRO) 20 MG tablet TAKE ONE TABLET BY MOUTH EVERY EVENING 90 tablet 1    SUMAtriptan (IMITREX) 100 MG tablet 1 tab po prn migraine.  May repeat in 1 hour for max dose 200 mg 24 hour period 27

## 2023-09-26 LAB
CHOLEST SERPL-MCNC: 154 MG/DL (ref 0–199)
GLUCOSE SERPL-MCNC: 88 MG/DL (ref 70–99)
HDLC SERPL-MCNC: 34 MG/DL (ref 40–60)
LDLC SERPL CALC-MCNC: 90 MG/DL
TRIGL SERPL-MCNC: 152 MG/DL (ref 0–150)

## 2023-09-27 ENCOUNTER — PATIENT MESSAGE (OUTPATIENT)
Dept: INTERNAL MEDICINE CLINIC | Age: 28
End: 2023-09-27

## 2023-09-27 DIAGNOSIS — F41.8 MIXED ANXIETY AND DEPRESSIVE DISORDER: ICD-10-CM

## 2023-09-27 RX ORDER — BUPROPION HYDROCHLORIDE 300 MG/1
300 TABLET ORAL EVERY MORNING
Qty: 90 TABLET | Refills: 1 | Status: SHIPPED | OUTPATIENT
Start: 2023-09-27

## 2023-09-27 NOTE — TELEPHONE ENCOUNTER
From: Miguel Rodarte  To: Dr. Marayna Harris: 9/27/2023 10:02 AM EDT  Subject: My new meds    Hi Dr Jamal Meza - just wanted to let you know I've been seeing great results with the antidepressant we added. My mood, motivation and enjoyment have noticeably increased. Generally I haven't seen much improvement in my energy, still feeling a lot of fatigue on a regular basis but everything else has been really positive! I do think I'll need a refill before I return for annual in Nov. Talk more then!

## 2023-10-18 ENCOUNTER — E-VISIT (OUTPATIENT)
Dept: INTERNAL MEDICINE CLINIC | Age: 28
End: 2023-10-18
Payer: COMMERCIAL

## 2023-10-18 DIAGNOSIS — B96.89 ACUTE BACTERIAL SINUSITIS: Primary | ICD-10-CM

## 2023-10-18 DIAGNOSIS — J01.90 ACUTE BACTERIAL SINUSITIS: Primary | ICD-10-CM

## 2023-10-18 PROCEDURE — 99421 OL DIG E/M SVC 5-10 MIN: CPT | Performed by: INTERNAL MEDICINE

## 2023-10-18 ASSESSMENT — LIFESTYLE VARIABLES: SMOKING_STATUS: NO, I'VE NEVER SMOKED

## 2023-10-19 RX ORDER — AMOXICILLIN AND CLAVULANATE POTASSIUM 875; 125 MG/1; MG/1
1 TABLET, FILM COATED ORAL 2 TIMES DAILY
Qty: 14 TABLET | Refills: 0 | Status: SHIPPED | OUTPATIENT
Start: 2023-10-19 | End: 2023-10-26

## 2023-10-19 RX ORDER — AMOXICILLIN AND CLAVULANATE POTASSIUM 875; 125 MG/1; MG/1
1 TABLET, FILM COATED ORAL 2 TIMES DAILY
Qty: 14 TABLET | Refills: 0 | Status: SHIPPED | OUTPATIENT
Start: 2023-10-19 | End: 2023-10-19

## 2023-10-19 NOTE — PROGRESS NOTES
HPI: as per patient provided history  Exam: N/A (electronic visit)  ASSESSMENT/PLAN:  Diagnoses and all orders for this visit:    Acute bacterial sinusitis  -     Discontinue: amoxicillin-clavulanate (AUGMENTIN) 875-125 MG per tablet; Take 1 tablet by mouth 2 times daily for 7 days  -     amoxicillin-clavulanate (AUGMENTIN) 875-125 MG per tablet; Take 1 tablet by mouth 2 times daily for 7 days        Patient instructed to call the office if worsens, or fails to improve as anticipated. 5-10 minutes were spent on the digital evaluation and management of this patient.     Jovan Miller MD

## 2023-11-03 DIAGNOSIS — Z13.220 ENCOUNTER FOR LIPID SCREENING FOR CARDIOVASCULAR DISEASE: ICD-10-CM

## 2023-11-03 DIAGNOSIS — Z13.1 SCREENING FOR DIABETES MELLITUS (DM): ICD-10-CM

## 2023-11-03 DIAGNOSIS — Z13.6 ENCOUNTER FOR LIPID SCREENING FOR CARDIOVASCULAR DISEASE: ICD-10-CM

## 2023-11-03 DIAGNOSIS — F41.8 MIXED ANXIETY AND DEPRESSIVE DISORDER: ICD-10-CM

## 2023-11-03 DIAGNOSIS — R79.82 ELEVATED C-REACTIVE PROTEIN (CRP): ICD-10-CM

## 2023-11-03 DIAGNOSIS — R70.0 ELEVATED SEDIMENTATION RATE: ICD-10-CM

## 2023-11-04 LAB
ALBUMIN SERPL-MCNC: 4.6 G/DL (ref 3.4–5)
ALBUMIN/GLOB SERPL: 1.6 {RATIO} (ref 1.1–2.2)
ALP SERPL-CCNC: 130 U/L (ref 40–129)
ALT SERPL-CCNC: 28 U/L (ref 10–40)
ANION GAP SERPL CALCULATED.3IONS-SCNC: 14 MMOL/L (ref 3–16)
AST SERPL-CCNC: 23 U/L (ref 15–37)
BASOPHILS # BLD: 0.1 K/UL (ref 0–0.2)
BASOPHILS NFR BLD: 0.5 %
BILIRUB SERPL-MCNC: 0.5 MG/DL (ref 0–1)
BUN SERPL-MCNC: 9 MG/DL (ref 7–20)
CALCIUM SERPL-MCNC: 10 MG/DL (ref 8.3–10.6)
CHLORIDE SERPL-SCNC: 100 MMOL/L (ref 99–110)
CHOLEST SERPL-MCNC: 174 MG/DL (ref 0–199)
CO2 SERPL-SCNC: 24 MMOL/L (ref 21–32)
CREAT SERPL-MCNC: 0.7 MG/DL (ref 0.6–1.1)
CRP SERPL-MCNC: 29.8 MG/L (ref 0–5.1)
DEPRECATED RDW RBC AUTO: 13.9 % (ref 12.4–15.4)
EOSINOPHIL # BLD: 0.2 K/UL (ref 0–0.6)
EOSINOPHIL NFR BLD: 1.8 %
ERYTHROCYTE [SEDIMENTATION RATE] IN BLOOD BY WESTERGREN METHOD: 33 MM/HR (ref 0–20)
EST. AVERAGE GLUCOSE BLD GHB EST-MCNC: 108.3 MG/DL
GFR SERPLBLD CREATININE-BSD FMLA CKD-EPI: >60 ML/MIN/{1.73_M2}
GLUCOSE SERPL-MCNC: 86 MG/DL (ref 70–99)
HBA1C MFR BLD: 5.4 %
HCT VFR BLD AUTO: 41.1 % (ref 36–48)
HDLC SERPL-MCNC: 42 MG/DL (ref 40–60)
HGB BLD-MCNC: 13.6 G/DL (ref 12–16)
LDLC SERPL CALC-MCNC: 116 MG/DL
LYMPHOCYTES # BLD: 2.1 K/UL (ref 1–5.1)
LYMPHOCYTES NFR BLD: 16.8 %
MCH RBC QN AUTO: 28.5 PG (ref 26–34)
MCHC RBC AUTO-ENTMCNC: 33.2 G/DL (ref 31–36)
MCV RBC AUTO: 85.7 FL (ref 80–100)
MONOCYTES # BLD: 0.5 K/UL (ref 0–1.3)
MONOCYTES NFR BLD: 4.3 %
NEUTROPHILS # BLD: 9.8 K/UL (ref 1.7–7.7)
NEUTROPHILS NFR BLD: 76.6 %
PLATELET # BLD AUTO: 395 K/UL (ref 135–450)
PMV BLD AUTO: 8.7 FL (ref 5–10.5)
POTASSIUM SERPL-SCNC: 5 MMOL/L (ref 3.5–5.1)
PROT SERPL-MCNC: 7.5 G/DL (ref 6.4–8.2)
RBC # BLD AUTO: 4.79 M/UL (ref 4–5.2)
SODIUM SERPL-SCNC: 138 MMOL/L (ref 136–145)
TRIGL SERPL-MCNC: 82 MG/DL (ref 0–150)
VLDLC SERPL CALC-MCNC: 16 MG/DL
WBC # BLD AUTO: 12.8 K/UL (ref 4–11)

## 2023-11-06 ENCOUNTER — OFFICE VISIT (OUTPATIENT)
Dept: INTERNAL MEDICINE CLINIC | Age: 28
End: 2023-11-06
Payer: COMMERCIAL

## 2023-11-06 VITALS
DIASTOLIC BLOOD PRESSURE: 88 MMHG | HEART RATE: 78 BPM | BODY MASS INDEX: 34.38 KG/M2 | SYSTOLIC BLOOD PRESSURE: 130 MMHG | WEIGHT: 194 LBS | HEIGHT: 63 IN | OXYGEN SATURATION: 99 %

## 2023-11-06 DIAGNOSIS — D72.829 LEUKOCYTOSIS, UNSPECIFIED TYPE: ICD-10-CM

## 2023-11-06 DIAGNOSIS — Z00.00 ANNUAL PHYSICAL EXAM: Primary | ICD-10-CM

## 2023-11-06 DIAGNOSIS — F41.8 MIXED ANXIETY AND DEPRESSIVE DISORDER: ICD-10-CM

## 2023-11-06 PROCEDURE — 90674 CCIIV4 VAC NO PRSV 0.5 ML IM: CPT | Performed by: INTERNAL MEDICINE

## 2023-11-06 PROCEDURE — 99395 PREV VISIT EST AGE 18-39: CPT | Performed by: INTERNAL MEDICINE

## 2023-11-06 PROCEDURE — 90471 IMMUNIZATION ADMIN: CPT | Performed by: INTERNAL MEDICINE

## 2023-11-06 ASSESSMENT — ENCOUNTER SYMPTOMS
COLOR CHANGE: 0
CONSTIPATION: 0
SINUS PRESSURE: 0
BACK PAIN: 0
WHEEZING: 0
COUGH: 0
PHOTOPHOBIA: 0
DIARRHEA: 0
NAUSEA: 0
EYE PAIN: 0
ABDOMINAL PAIN: 0
TROUBLE SWALLOWING: 0
SINUS PAIN: 0
VOMITING: 0
SHORTNESS OF BREATH: 0

## 2023-11-06 NOTE — PROGRESS NOTES
Vaccine Information Sheet, \"Influenza - Inactivated\"  given to Chanelle Valera, or parent/legal guardian of  Chanelle Vlaera and verbalized understanding. Patient responses:    Have you ever had a reaction to a flu vaccine? No  Do you have any current illness? No  Have you ever had Guillian Newcastle Syndrome? No  Do you have a serious allergy to any of the follow: Neomycin, Polymyxin, Thimerosal, eggs or egg products? No    Flu vaccine given per order. Please see immunization tab. Risks and benefits explained. Current VIS given.       Immunizations Administered       Name Date Dose Route    Influenza, FLUCELVAX, (age 10 mo+), MDCK, PF, 0.5mL 11/6/2023 0.5 mL Intramuscular    Site: Deltoid- Right    Lot: [id number]    [address]: 05575-543-30

## 2023-11-06 NOTE — ASSESSMENT & PLAN NOTE
Doing well on wellbutrin 300mg with her lexapro 20mg daily. She is doing psychotherapy with a psychologist - Dr. Chantel Ribeiro.

## 2023-11-20 DIAGNOSIS — F32.1 CURRENT MODERATE EPISODE OF MAJOR DEPRESSIVE DISORDER WITHOUT PRIOR EPISODE (HCC): ICD-10-CM

## 2023-11-20 RX ORDER — ESCITALOPRAM OXALATE 20 MG/1
TABLET ORAL
Qty: 90 TABLET | Refills: 1 | Status: SHIPPED | OUTPATIENT
Start: 2023-11-20

## 2023-11-20 NOTE — TELEPHONE ENCOUNTER
Medication:   Requested Prescriptions     Pending Prescriptions Disp Refills    escitalopram (LEXAPRO) 20 MG tablet [Pharmacy Med Name: ESCITALOPRAM OXALATE 20MG TABS] 90 tablet 1     Sig: TAKE ONE TABLET BY MOUTH EVERY EVENING        Last Filled:  8/25/2023    Patient Phone Number: [phone] (home)     Last appt: 11/6/2023   Next appt: Visit date not found

## 2024-01-03 ENCOUNTER — E-VISIT (OUTPATIENT)
Dept: INTERNAL MEDICINE CLINIC | Age: 29
End: 2024-01-03

## 2024-01-03 DIAGNOSIS — J01.90 ACUTE BACTERIAL SINUSITIS: Primary | ICD-10-CM

## 2024-01-03 DIAGNOSIS — B96.89 ACUTE BACTERIAL SINUSITIS: Primary | ICD-10-CM

## 2024-01-03 PROCEDURE — 99421 OL DIG E/M SVC 5-10 MIN: CPT | Performed by: INTERNAL MEDICINE

## 2024-01-03 ASSESSMENT — LIFESTYLE VARIABLES: SMOKING_STATUS: NO, I'VE NEVER SMOKED

## 2024-01-04 RX ORDER — AMOXICILLIN AND CLAVULANATE POTASSIUM 875; 125 MG/1; MG/1
1 TABLET, FILM COATED ORAL 2 TIMES DAILY
Qty: 14 TABLET | Refills: 0 | Status: SHIPPED | OUTPATIENT
Start: 2024-01-04 | End: 2024-01-11

## 2024-01-04 NOTE — PROGRESS NOTES
HPI: as per patient provided history  Exam: N/A (electronic visit)  ASSESSMENT/PLAN:  Diagnoses and all orders for this visit:    Acute bacterial sinusitis  -     amoxicillin-clavulanate (AUGMENTIN) 875-125 MG per tablet; Take 1 tablet by mouth 2 times daily for 7 days    OTC mucinex    Patient instructed to call the office if worsens, or fails to improve as anticipated.    5-10 minutes were spent on the digital evaluation and management of this patient.    Maria R Pinzon MD

## 2024-01-11 DIAGNOSIS — D72.829 LEUKOCYTOSIS, UNSPECIFIED TYPE: ICD-10-CM

## 2024-01-11 LAB — PATH INTERP BLD-IMP: NORMAL

## 2024-01-12 LAB — PATH INTERP BLD-IMP: NORMAL

## 2024-02-27 DIAGNOSIS — F41.8 MIXED ANXIETY AND DEPRESSIVE DISORDER: ICD-10-CM

## 2024-02-27 RX ORDER — BUPROPION HYDROCHLORIDE 300 MG/1
300 TABLET ORAL EVERY MORNING
Qty: 90 TABLET | Refills: 1 | Status: SHIPPED | OUTPATIENT
Start: 2024-02-27

## 2024-02-27 NOTE — TELEPHONE ENCOUNTER
Refill Request     Last Seen: 1/3/2024    Last Written: 9/27/23    Next Appointment:   Future Appointments   Date Time Provider Department Center   3/19/2024  3:00 PM Maria R Pinzon MD [facility]             Requested Prescriptions     Pending Prescriptions Disp Refills    buPROPion (WELLBUTRIN XL) 300 MG extended release tablet [Pharmacy Med Name: BUPROPION HCL ER (XL) 300MG TB24] 90 tablet 1     Sig: TAKE ONE TABLET BY MOUTH EVERY MORNING

## 2024-03-18 ASSESSMENT — PATIENT HEALTH QUESTIONNAIRE - PHQ9
8. MOVING OR SPEAKING SO SLOWLY THAT OTHER PEOPLE COULD HAVE NOTICED. OR THE OPPOSITE, BEING SO FIGETY OR RESTLESS THAT YOU HAVE BEEN MOVING AROUND A LOT MORE THAN USUAL: NOT AT ALL
SUM OF ALL RESPONSES TO PHQ QUESTIONS 1-9: 4
8. MOVING OR SPEAKING SO SLOWLY THAT OTHER PEOPLE COULD HAVE NOTICED. OR THE OPPOSITE - BEING SO FIDGETY OR RESTLESS THAT YOU HAVE BEEN MOVING AROUND A LOT MORE THAN USUAL: NOT AT ALL
3. TROUBLE FALLING OR STAYING ASLEEP: SEVERAL DAYS
9. THOUGHTS THAT YOU WOULD BE BETTER OFF DEAD, OR OF HURTING YOURSELF: NOT AT ALL
2. FEELING DOWN, DEPRESSED OR HOPELESS: NOT AT ALL
SUM OF ALL RESPONSES TO PHQ QUESTIONS 1-9: 4
7. TROUBLE CONCENTRATING ON THINGS, SUCH AS READING THE NEWSPAPER OR WATCHING TELEVISION: NOT AT ALL
SUM OF ALL RESPONSES TO PHQ QUESTIONS 1-9: 4
SUM OF ALL RESPONSES TO PHQ QUESTIONS 1-9: 4
5. POOR APPETITE OR OVEREATING: SEVERAL DAYS
9. THOUGHTS THAT YOU WOULD BE BETTER OFF DEAD, OR OF HURTING YOURSELF: NOT AT ALL
2. FEELING DOWN, DEPRESSED OR HOPELESS: NOT AT ALL
6. FEELING BAD ABOUT YOURSELF - OR THAT YOU ARE A FAILURE OR HAVE LET YOURSELF OR YOUR FAMILY DOWN: SEVERAL DAYS
SUM OF ALL RESPONSES TO PHQ QUESTIONS 1-9: 4
4. FEELING TIRED OR HAVING LITTLE ENERGY: SEVERAL DAYS
SUM OF ALL RESPONSES TO PHQ9 QUESTIONS 1 & 2: 0
5. POOR APPETITE OR OVEREATING: SEVERAL DAYS
4. FEELING TIRED OR HAVING LITTLE ENERGY: SEVERAL DAYS
10. IF YOU CHECKED OFF ANY PROBLEMS, HOW DIFFICULT HAVE THESE PROBLEMS MADE IT FOR YOU TO DO YOUR WORK, TAKE CARE OF THINGS AT HOME, OR GET ALONG WITH OTHER PEOPLE: NOT DIFFICULT AT ALL
3. TROUBLE FALLING OR STAYING ASLEEP: SEVERAL DAYS
1. LITTLE INTEREST OR PLEASURE IN DOING THINGS: NOT AT ALL
6. FEELING BAD ABOUT YOURSELF - OR THAT YOU ARE A FAILURE OR HAVE LET YOURSELF OR YOUR FAMILY DOWN: SEVERAL DAYS
7. TROUBLE CONCENTRATING ON THINGS, SUCH AS READING THE NEWSPAPER OR WATCHING TELEVISION: NOT AT ALL
1. LITTLE INTEREST OR PLEASURE IN DOING THINGS: NOT AT ALL
10. IF YOU CHECKED OFF ANY PROBLEMS, HOW DIFFICULT HAVE THESE PROBLEMS MADE IT FOR YOU TO DO YOUR WORK, TAKE CARE OF THINGS AT HOME, OR GET ALONG WITH OTHER PEOPLE: NOT DIFFICULT AT ALL

## 2024-03-19 ENCOUNTER — OFFICE VISIT (OUTPATIENT)
Dept: INTERNAL MEDICINE CLINIC | Age: 29
End: 2024-03-19
Payer: COMMERCIAL

## 2024-03-19 VITALS
BODY MASS INDEX: 34.47 KG/M2 | HEART RATE: 76 BPM | OXYGEN SATURATION: 98 % | DIASTOLIC BLOOD PRESSURE: 80 MMHG | WEIGHT: 194.6 LBS | SYSTOLIC BLOOD PRESSURE: 118 MMHG

## 2024-03-19 DIAGNOSIS — G44.89 OTHER HEADACHE SYNDROME: ICD-10-CM

## 2024-03-19 DIAGNOSIS — F32.1 CURRENT MODERATE EPISODE OF MAJOR DEPRESSIVE DISORDER WITHOUT PRIOR EPISODE (HCC): Primary | ICD-10-CM

## 2024-03-19 DIAGNOSIS — F41.8 MIXED ANXIETY AND DEPRESSIVE DISORDER: ICD-10-CM

## 2024-03-19 DIAGNOSIS — J30.2 SEASONAL ALLERGIC RHINITIS, UNSPECIFIED TRIGGER: ICD-10-CM

## 2024-03-19 PROCEDURE — 99214 OFFICE O/P EST MOD 30 MIN: CPT | Performed by: INTERNAL MEDICINE

## 2024-03-19 RX ORDER — MOMETASONE FUROATE 50 UG/1
SPRAY, METERED NASAL
COMMUNITY

## 2024-03-19 RX ORDER — ESCITALOPRAM OXALATE 20 MG/1
20 TABLET ORAL NIGHTLY
Qty: 90 TABLET | Refills: 3 | Status: SHIPPED | OUTPATIENT
Start: 2024-03-19

## 2024-03-19 RX ORDER — BUPROPION HYDROCHLORIDE 300 MG/1
300 TABLET ORAL EVERY MORNING
Qty: 90 TABLET | Refills: 3 | Status: SHIPPED | OUTPATIENT
Start: 2024-03-19

## 2024-03-19 RX ORDER — SUMATRIPTAN 100 MG/1
TABLET, FILM COATED ORAL
Qty: 27 TABLET | Refills: 3 | Status: SHIPPED | OUTPATIENT
Start: 2024-03-19

## 2024-03-19 SDOH — ECONOMIC STABILITY: FOOD INSECURITY: WITHIN THE PAST 12 MONTHS, YOU WORRIED THAT YOUR FOOD WOULD RUN OUT BEFORE YOU GOT MONEY TO BUY MORE.: NEVER TRUE

## 2024-03-19 SDOH — ECONOMIC STABILITY: FOOD INSECURITY: WITHIN THE PAST 12 MONTHS, THE FOOD YOU BOUGHT JUST DIDN'T LAST AND YOU DIDN'T HAVE MONEY TO GET MORE.: NEVER TRUE

## 2024-03-19 SDOH — ECONOMIC STABILITY: INCOME INSECURITY: HOW HARD IS IT FOR YOU TO PAY FOR THE VERY BASICS LIKE FOOD, HOUSING, MEDICAL CARE, AND HEATING?: NOT HARD AT ALL

## 2024-03-19 NOTE — PROGRESS NOTES
Vincenzo Feliz (:  1995) is a 28 y.o. female,Established patient, here for evaluation of the following chief complaint(s):  Depression and Sinusitis      ASSESSMENT/PLAN:  1. Current moderate episode of major depressive disorder without prior episode (HCC)  Assessment & Plan:  Doing well on wellbutrin 300mg with her lexapro 20mg daily. She is doing psychotherapy with a psychologist - Dr. Lexis Canales. No changes today.   Orders:  -     escitalopram (LEXAPRO) 20 MG tablet; Take 1 tablet by mouth nightly, Disp-90 tablet, R-3Normal  2. Mixed anxiety and depressive disorder  Assessment & Plan:  Doing well on wellbutrin 300mg with her lexapro 20mg daily. She is doing psychotherapy with a psychologist - Dr. Lexis Canales. No changes today.   Orders:  -     buPROPion (WELLBUTRIN XL) 300 MG extended release tablet; Take 1 tablet by mouth every morning, Disp-90 tablet, R-3Normal  3. Other headache syndrome  Comments:  Rx sumatriptan.  It was helpful in the past.  Orders:  -     SUMAtriptan (IMITREX) 100 MG tablet; 1 tab po prn migraine. May repeat in 1 hour for max dose 200 mg 24 hour period, Disp-27 tablet, R-3Normal  4. Seasonal allergic rhinitis, unspecified trigger  Assessment & Plan:  Saw allergy. Doing great with change to xyzal and nasonex.        No follow-ups on file.    SUBJECTIVE/OBJECTIVE:  HPI    New gyn - Dr. Callahan at [facility]. Seeing in April    Overall things are going well.   Still doing therapy and very happy with some life changes she is making.     Sinuses are improved significantly with xyzal and nasonex. Saw allergy.     Review of Systems    Current Outpatient Medications on File Prior to Visit   Medication Sig Dispense Refill    LEVOCETIRIZINE DIHYDROCHLORIDE PO       mometasone (NASONEX) 50 MCG/ACT nasal spray       triamcinolone (KENALOG) 0.1 % ointment Apply topically to areas of eczema BID for up to 2 weeks, then stop for 2 weeks prn flares. Avoid face, groin, body folds 30 g 1     No